# Patient Record
Sex: FEMALE | Race: WHITE | Employment: OTHER | URBAN - METROPOLITAN AREA
[De-identification: names, ages, dates, MRNs, and addresses within clinical notes are randomized per-mention and may not be internally consistent; named-entity substitution may affect disease eponyms.]

---

## 2018-09-11 PROBLEM — E11.9 DMII (DIABETES MELLITUS, TYPE 2) (HCC): Status: ACTIVE | Noted: 2018-09-11

## 2018-09-11 PROBLEM — G93.41 ACUTE METABOLIC ENCEPHALOPATHY: Status: ACTIVE | Noted: 2018-09-11

## 2018-09-11 PROBLEM — T50.901A ACCIDENTAL MEDICATION OVERDOSE, INITIAL ENCOUNTER: Status: ACTIVE | Noted: 2018-09-11

## 2018-09-11 PROBLEM — I63.9 STROKE, ACUTE, EMBOLIC (HCC): Status: ACTIVE | Noted: 2018-09-11

## 2018-09-11 PROBLEM — K92.2 GI BLEED: Status: ACTIVE | Noted: 2018-09-11

## 2020-08-02 ENCOUNTER — APPOINTMENT (OUTPATIENT)
Dept: CT IMAGING | Age: 75
DRG: 871 | End: 2020-08-02
Payer: MEDICARE

## 2020-08-02 ENCOUNTER — HOSPITAL ENCOUNTER (INPATIENT)
Age: 75
LOS: 3 days | Discharge: HOME | DRG: 871 | End: 2020-08-05
Attending: STUDENT IN AN ORGANIZED HEALTH CARE EDUCATION/TRAINING PROGRAM | Admitting: STUDENT IN AN ORGANIZED HEALTH CARE EDUCATION/TRAINING PROGRAM
Payer: MEDICARE

## 2020-08-02 ENCOUNTER — APPOINTMENT (OUTPATIENT)
Dept: GENERAL RADIOLOGY | Age: 75
DRG: 871 | End: 2020-08-02
Payer: MEDICARE

## 2020-08-02 PROBLEM — N39.0 URINARY TRACT INFECTION: Status: ACTIVE | Noted: 2020-08-02

## 2020-08-02 PROBLEM — J18.9 LEFT LOWER LOBE PNEUMONIA: Status: ACTIVE | Noted: 2020-08-02

## 2020-08-02 PROBLEM — A41.9 SEPSIS (HCC): Status: ACTIVE | Noted: 2020-08-02

## 2020-08-02 LAB
AMORPHOUS: ABNORMAL /HPF
ANION GAP SERPL CALCULATED.3IONS-SCNC: 15 MMOL/L (ref 3–16)
BASE EXCESS VENOUS: -2.1 MMOL/L
BASOPHILS ABSOLUTE: 0.1 K/UL (ref 0–0.2)
BASOPHILS RELATIVE PERCENT: 0.6 %
BILIRUBIN URINE: ABNORMAL
BLOOD, URINE: ABNORMAL
BUN BLDV-MCNC: 20 MG/DL (ref 7–20)
CALCIUM SERPL-MCNC: 9.4 MG/DL (ref 8.3–10.6)
CARBOXYHEMOGLOBIN: 1 %
CHLORIDE BLD-SCNC: 88 MMOL/L (ref 99–110)
CHLORIDE URINE RANDOM: 29 MMOL/L
CLARITY: ABNORMAL
CO2: 23 MMOL/L (ref 21–32)
COLOR: YELLOW
CREAT SERPL-MCNC: 1 MG/DL (ref 0.6–1.2)
EOSINOPHILS ABSOLUTE: 0 K/UL (ref 0–0.6)
EOSINOPHILS RELATIVE PERCENT: 0 %
EPITHELIAL CELLS, UA: 1 /HPF (ref 0–5)
GFR AFRICAN AMERICAN: >60
GFR NON-AFRICAN AMERICAN: 54
GLUCOSE BLD-MCNC: 250 MG/DL (ref 70–99)
GLUCOSE URINE: NEGATIVE MG/DL
HCO3 VENOUS: 21 MMOL/L (ref 23–29)
HCT VFR BLD CALC: 44.5 % (ref 36–48)
HEMOGLOBIN: 14.8 G/DL (ref 12–16)
HYALINE CASTS: 10 /LPF (ref 0–8)
KETONES, URINE: ABNORMAL MG/DL
LACTIC ACID, SEPSIS: 2.3 MMOL/L (ref 0.4–1.9)
LEUKOCYTE ESTERASE, URINE: ABNORMAL
LYMPHOCYTES ABSOLUTE: 0.9 K/UL (ref 1–5.1)
LYMPHOCYTES RELATIVE PERCENT: 5.9 %
MCH RBC QN AUTO: 30.6 PG (ref 26–34)
MCHC RBC AUTO-ENTMCNC: 33.3 G/DL (ref 31–36)
MCV RBC AUTO: 91.9 FL (ref 80–100)
METHEMOGLOBIN VENOUS: 0.6 %
MICROSCOPIC EXAMINATION: YES
MONOCYTES ABSOLUTE: 1 K/UL (ref 0–1.3)
MONOCYTES RELATIVE PERCENT: 6.2 %
NEUTROPHILS ABSOLUTE: 13.8 K/UL (ref 1.7–7.7)
NEUTROPHILS RELATIVE PERCENT: 87.3 %
NITRITE, URINE: NEGATIVE
O2 CONTENT, VEN: 14 ML/DL
O2 SAT, VEN: 83 %
O2 THERAPY: ABNORMAL
PCO2, VEN: 31 MMHG (ref 40–50)
PDW BLD-RTO: 14.4 % (ref 12.4–15.4)
PH UA: 6 (ref 5–8)
PH VENOUS: 7.44 (ref 7.35–7.45)
PLATELET # BLD: 225 K/UL (ref 135–450)
PMV BLD AUTO: 8.6 FL (ref 5–10.5)
PO2, VEN: 46 MMHG
POTASSIUM REFLEX MAGNESIUM: 5 MMOL/L (ref 3.5–5.1)
POTASSIUM, UR: 98.5 MMOL/L
PRO-BNP: 710 PG/ML (ref 0–124)
PROTEIN UA: 100 MG/DL
RBC # BLD: 4.84 M/UL (ref 4–5.2)
RBC UA: 3 /HPF (ref 0–4)
SARS-COV-2, NAAT: NOT DETECTED
SODIUM BLD-SCNC: 126 MMOL/L (ref 136–145)
SODIUM URINE: 35 MMOL/L
SPECIFIC GRAVITY UA: 1.02 (ref 1–1.03)
TCO2 CALC VENOUS: 22 MMOL/L
TROPONIN: <0.01 NG/ML
URINE REFLEX TO CULTURE: YES
URINE TYPE: ABNORMAL
UROBILINOGEN, URINE: 1 E.U./DL
WBC # BLD: 15.9 K/UL (ref 4–11)
WBC UA: 138 /HPF (ref 0–5)

## 2020-08-02 PROCEDURE — 71045 X-RAY EXAM CHEST 1 VIEW: CPT

## 2020-08-02 PROCEDURE — 87040 BLOOD CULTURE FOR BACTERIA: CPT

## 2020-08-02 PROCEDURE — 83605 ASSAY OF LACTIC ACID: CPT

## 2020-08-02 PROCEDURE — 2060000000 HC ICU INTERMEDIATE R&B

## 2020-08-02 PROCEDURE — 87086 URINE CULTURE/COLONY COUNT: CPT

## 2020-08-02 PROCEDURE — 84145 PROCALCITONIN (PCT): CPT

## 2020-08-02 PROCEDURE — 84484 ASSAY OF TROPONIN QUANT: CPT

## 2020-08-02 PROCEDURE — 6360000002 HC RX W HCPCS: Performed by: PHYSICIAN ASSISTANT

## 2020-08-02 PROCEDURE — 82436 ASSAY OF URINE CHLORIDE: CPT

## 2020-08-02 PROCEDURE — 99285 EMERGENCY DEPT VISIT HI MDM: CPT

## 2020-08-02 PROCEDURE — 70450 CT HEAD/BRAIN W/O DYE: CPT

## 2020-08-02 PROCEDURE — 93005 ELECTROCARDIOGRAM TRACING: CPT | Performed by: PHYSICIAN ASSISTANT

## 2020-08-02 PROCEDURE — 81001 URINALYSIS AUTO W/SCOPE: CPT

## 2020-08-02 PROCEDURE — 2580000003 HC RX 258: Performed by: PHYSICIAN ASSISTANT

## 2020-08-02 PROCEDURE — 80048 BASIC METABOLIC PNL TOTAL CA: CPT

## 2020-08-02 PROCEDURE — 96365 THER/PROPH/DIAG IV INF INIT: CPT

## 2020-08-02 PROCEDURE — 82803 BLOOD GASES ANY COMBINATION: CPT

## 2020-08-02 PROCEDURE — U0002 COVID-19 LAB TEST NON-CDC: HCPCS

## 2020-08-02 PROCEDURE — 87449 NOS EACH ORGANISM AG IA: CPT

## 2020-08-02 PROCEDURE — 84300 ASSAY OF URINE SODIUM: CPT

## 2020-08-02 PROCEDURE — 71250 CT THORAX DX C-: CPT

## 2020-08-02 PROCEDURE — 84133 ASSAY OF URINE POTASSIUM: CPT

## 2020-08-02 PROCEDURE — 6370000000 HC RX 637 (ALT 250 FOR IP): Performed by: PHYSICIAN ASSISTANT

## 2020-08-02 PROCEDURE — 85025 COMPLETE CBC W/AUTO DIFF WBC: CPT

## 2020-08-02 PROCEDURE — 83880 ASSAY OF NATRIURETIC PEPTIDE: CPT

## 2020-08-02 PROCEDURE — 36415 COLL VENOUS BLD VENIPUNCTURE: CPT

## 2020-08-02 RX ORDER — SODIUM CHLORIDE 0.9 % (FLUSH) 0.9 %
10 SYRINGE (ML) INJECTION PRN
Status: DISCONTINUED | OUTPATIENT
Start: 2020-08-02 | End: 2020-08-02 | Stop reason: SDUPTHER

## 2020-08-02 RX ORDER — 0.9 % SODIUM CHLORIDE 0.9 %
1000 INTRAVENOUS SOLUTION INTRAVENOUS ONCE
Status: COMPLETED | OUTPATIENT
Start: 2020-08-02 | End: 2020-08-02

## 2020-08-02 RX ORDER — ONDANSETRON 2 MG/ML
4 INJECTION INTRAMUSCULAR; INTRAVENOUS EVERY 6 HOURS PRN
Status: DISCONTINUED | OUTPATIENT
Start: 2020-08-02 | End: 2020-08-05 | Stop reason: HOSPADM

## 2020-08-02 RX ORDER — SODIUM CHLORIDE 0.9 % (FLUSH) 0.9 %
10 SYRINGE (ML) INJECTION EVERY 12 HOURS SCHEDULED
Status: DISCONTINUED | OUTPATIENT
Start: 2020-08-03 | End: 2020-08-02 | Stop reason: SDUPTHER

## 2020-08-02 RX ORDER — ACETAMINOPHEN 325 MG/1
650 TABLET ORAL ONCE
Status: COMPLETED | OUTPATIENT
Start: 2020-08-02 | End: 2020-08-02

## 2020-08-02 RX ORDER — SODIUM CHLORIDE 0.9 % (FLUSH) 0.9 %
10 SYRINGE (ML) INJECTION PRN
Status: DISCONTINUED | OUTPATIENT
Start: 2020-08-02 | End: 2020-08-05 | Stop reason: HOSPADM

## 2020-08-02 RX ORDER — SODIUM CHLORIDE 0.9 % (FLUSH) 0.9 %
10 SYRINGE (ML) INJECTION EVERY 12 HOURS SCHEDULED
Status: DISCONTINUED | OUTPATIENT
Start: 2020-08-03 | End: 2020-08-05 | Stop reason: HOSPADM

## 2020-08-02 RX ORDER — LOSARTAN POTASSIUM 25 MG/1
25 TABLET ORAL DAILY
Status: DISCONTINUED | OUTPATIENT
Start: 2020-08-03 | End: 2020-08-05 | Stop reason: HOSPADM

## 2020-08-02 RX ORDER — ASPIRIN 81 MG/1
81 TABLET ORAL DAILY
Status: DISCONTINUED | OUTPATIENT
Start: 2020-08-03 | End: 2020-08-05 | Stop reason: HOSPADM

## 2020-08-02 RX ORDER — 0.9 % SODIUM CHLORIDE 0.9 %
1540 INTRAVENOUS SOLUTION INTRAVENOUS ONCE
Status: COMPLETED | OUTPATIENT
Start: 2020-08-02 | End: 2020-08-02

## 2020-08-02 RX ORDER — NICOTINE POLACRILEX 4 MG
15 LOZENGE BUCCAL PRN
Status: DISCONTINUED | OUTPATIENT
Start: 2020-08-02 | End: 2020-08-05 | Stop reason: HOSPADM

## 2020-08-02 RX ORDER — DEXTROSE MONOHYDRATE 50 MG/ML
100 INJECTION, SOLUTION INTRAVENOUS PRN
Status: DISCONTINUED | OUTPATIENT
Start: 2020-08-02 | End: 2020-08-05 | Stop reason: HOSPADM

## 2020-08-02 RX ORDER — VENLAFAXINE HYDROCHLORIDE 75 MG/1
75 CAPSULE, EXTENDED RELEASE ORAL DAILY
Status: DISCONTINUED | OUTPATIENT
Start: 2020-08-03 | End: 2020-08-05 | Stop reason: HOSPADM

## 2020-08-02 RX ORDER — DEXTROSE MONOHYDRATE 25 G/50ML
12.5 INJECTION, SOLUTION INTRAVENOUS PRN
Status: DISCONTINUED | OUTPATIENT
Start: 2020-08-02 | End: 2020-08-05 | Stop reason: HOSPADM

## 2020-08-02 RX ORDER — ATORVASTATIN CALCIUM 40 MG/1
40 TABLET, FILM COATED ORAL DAILY
Status: DISCONTINUED | OUTPATIENT
Start: 2020-08-03 | End: 2020-08-05 | Stop reason: HOSPADM

## 2020-08-02 RX ORDER — SODIUM CHLORIDE 9 MG/ML
INJECTION, SOLUTION INTRAVENOUS CONTINUOUS
Status: ACTIVE | OUTPATIENT
Start: 2020-08-03 | End: 2020-08-03

## 2020-08-02 RX ADMIN — SODIUM CHLORIDE 1000 ML: 9 INJECTION, SOLUTION INTRAVENOUS at 18:45

## 2020-08-02 RX ADMIN — SODIUM CHLORIDE 1540 ML: 9 INJECTION, SOLUTION INTRAVENOUS at 20:01

## 2020-08-02 RX ADMIN — ACETAMINOPHEN 650 MG: 325 TABLET ORAL at 18:45

## 2020-08-02 RX ADMIN — CEFTRIAXONE 1 G: 1 INJECTION, POWDER, FOR SOLUTION INTRAMUSCULAR; INTRAVENOUS at 20:01

## 2020-08-02 ASSESSMENT — PAIN DESCRIPTION - PAIN TYPE: TYPE: ACUTE PAIN

## 2020-08-02 ASSESSMENT — PAIN SCALES - GENERAL
PAINLEVEL_OUTOF10: 0
PAINLEVEL_OUTOF10: 6
PAINLEVEL_OUTOF10: 0
PAINLEVEL_OUTOF10: 0
PAINLEVEL_OUTOF10: 6

## 2020-08-02 ASSESSMENT — PAIN DESCRIPTION - DESCRIPTORS: DESCRIPTORS: ACHING

## 2020-08-02 ASSESSMENT — PAIN DESCRIPTION - FREQUENCY: FREQUENCY: INTERMITTENT

## 2020-08-02 ASSESSMENT — PAIN DESCRIPTION - LOCATION: LOCATION: GENERALIZED;BACK

## 2020-08-02 NOTE — ED NOTES
Bed: B-04  Expected date: 8/2/20  Expected time: 5:26 PM  Means of arrival: [facility]  Comments:  SOB, Weakness     Osmani Ice Dax, Critical access hospital0 [facility] [place]  08/02/20 4496

## 2020-08-02 NOTE — H&P
Hospital Medicine History & Physical      PCP: Santino Orozco MD    Date of Admission: 8/2/2020    Date of Service: Pt seen/examined on 8/2/2020 and Admitted to Inpatient     chief Complaint: Cough, generalized weakness, fever      History Of Present Illness: The patient is a 70 y.o. female past medical history of anxiety, depression, headache, hyperlipidemia, hypertension who presents to [facility] with her days of worsening generalized weakness, fatigue, cough nonproductive, generalized malaise and a new onset fever the morning prior to arrival.  Patient interview when I saw her was somewhat limited as patient seemed to be somewhat more confused compared to what was read in the ED note. Per documentation, patient had the above symptoms and was more coherent and answering questions. All data presented, patient remarks to me and in documentation that she denied any chest pain, shortness of breath, abdominal pain, nausea, vomiting, diarrhea-she has been than usual but still eating. She also denies any dysuria, new or out of the ordinary pain. Past Medical History:        Diagnosis Date    Anxiety     Depression     has seen dr Linn Grimm in the past who placed her on the meds    Headache(784.0)     Hyperlipidemia     Hypertension     Insomnia        Past Surgical History:        Procedure Laterality Date    HYSTERECTOMY         Medications Prior to Admission:    Prior to Admission medications    Medication Sig Start Date End Date Taking?  Authorizing Provider   simvastatin (ZOCOR) 40 MG tablet Take 1 tablet by mouth nightly 9/14/18  Yes Roberto Lyles MD   pioglitazone (ACTOS) 30 MG tablet Take 30 mg by mouth daily   Yes Historical Provider, MD   glipiZIDE (GLUCOTROL XL) 10 MG extended release tablet Take 10 mg by mouth daily   Yes Historical Provider, MD losartan (COZAAR) 25 MG tablet Take 25 mg by mouth daily   Yes Historical Provider, MD   Multiple Vitamins-Minerals (CEROVITE ADVANCED FORMULA PO) Take  by mouth. Yes Historical Provider, MD   pantoprazole (PROTONIX) 40 MG tablet Take 1 tablet by mouth 2 times daily 9/14/18 10/14/18  Meka Lara MD   venlafaxine (EFFEXOR XR) 75 MG extended release capsule Take 75 mg by mouth daily    Historical Provider, MD   clonazepam (KLONOPIN) 1 MG tablet Take 1 tablet by mouth 2 times daily as needed for Anxiety. 4/12/12   Niall Kapoor MD   senna (SENOKOT) 8.6 MG tablet Take 2 tablets by mouth daily. 3/1/12   Niall Kapoor MD   vitamin D (ERGOCALCIFEROL) 59327 UNITS CAPS capsule Take 1 capsule by mouth every 28 days. 3/1/12   Niall Kapoor MD       Allergies:  Codeine and Sulfa antibiotics    Social History:  The patient currently lives home    TOBACCO:   reports that she has been smoking. She has never used smokeless tobacco.  ETOH:   reports no history of alcohol use. Since abuse: Negative  Family History:  Reviewed in detail and negative for DM, Early CAD, Cancer, CVA. Positive as follows: Adopted: Yes       REVIEW OF SYSTEMS: As noted in the HPI. All other systems reviewed and negative. PHYSICAL EXAM:    /66   Pulse 98   Temp 98.6 °F (37 °C) (Temporal)   Resp 16   Ht 5' 6\" (1.676 m)   Wt 181 lb 14.1 oz (82.5 kg)   SpO2 95%   BMI 29.36 kg/m²     General appearance: Alert and oriented times self and place. Chronically ill-appearing, fatigued, dry appearing, disheveled  HEENT Normal cephalic, atraumatic without obvious deformity. Pupils equal, round, and reactive to light. Extra ocular muscles intact. Conjunctivae/corneas clear. Mildly dry mucous membranes  Neck: Supple, no JVD, no cervical lymphadenopathy  Lungs: Diminished to all lung fields, crackles noted mainly to the bases, no wheezes.   Heart: Regular rate and rhythm, S1/S2 noted, no murmurs,  Abdomen: Soft, nontender, nondistended, no guarding, active bowel sounds  Extremities: No edema bilaterally  Skin: Dry, intact, no rashes  Neurologic: Grossly intact neurologically, however oriented as above, sensation and motor seem to be intact, strength 5 out of 5 to all extremities  Mental status: Orientation mentioned above  Capillary Refill: Acceptable  < 3 seconds  Peripheral Pulses: +3 Easily felt, not easily obliterated with pressure      CXR: Mild dependent left basilar atelectasis  CT chest noncontrast:   Left lower lobe pneumonia.  Recommend follow-up PA and lateral chest    radiographs in 8-12 weeks.         10 mm average diameter solid nodule in the subpleural right lower lobe is not    substantially changed since 09/11/2018, most likely a prominent benign    parenchymal lymph node.  Recommend follow-up CT chest in 6-12 months.         Moderate centrilobular and paraseptal emphysema.  Diffuse airway inflammation    may be related to smoking or reflect bronchitis.         Stable benign left adrenal adenomas.         Partially imaged right kidney demonstrates outwardly convex margins. Recommend nonemergent follow-up CT abdomen and pelvis with contrast to more    completely assess the right kidney.               CBC   Recent Labs     08/02/20  1818 08/03/20  0354   WBC 15.9* 13.7*   HGB 14.8 13.8   HCT 44.5 41.5    205      RENAL  Recent Labs     08/02/20  1818 08/02/20  2358 08/03/20  0354   * 131* 131*   K 5.0 4.3 4.1   CL 88* 92* 95*   CO2 23 21 22   BUN 20 17 14   CREATININE 1.0 1.0 0.8     LFT'S  Recent Labs     08/03/20  0354   AST 80*   ALT 29   BILITOT 0.9   ALKPHOS 43     COAG  No results for input(s): INR in the last 72 hours.   CARDIAC ENZYMES  Recent Labs     08/02/20  1818   TROPONINI <0.01       U/A:    Lab Results   Component Value Date    COLORU YELLOW 08/02/2020    WBCUA 138 08/02/2020    RBCUA 3 08/02/2020    CLARITYU CLOUDY 08/02/2020    SPECGRAV 1.023 08/02/2020    LEUKOCYTESUR MODERATE 08/02/2020    BLOODU MODERATE 08/02/2020    GLUCOSEU Negative 08/02/2020    AMORPHOUS 2+ 08/02/2020       ABG  No results found for: VJW2IRO, BEART, Y2BTETMU, PHART, THGBART, VTS9EET, PO2ART, XAS1BMX        Active Hospital Problems    Diagnosis Date Noted    Sepsis ([facility] [A41.9] 08/02/2020    Urinary tract infection [N39.0] 08/02/2020    Left lower lobe pneumonia ([facility] [J18.1] 08/02/2020    DMII (diabetes mellitus, type 2) ([facility] [E11.9] 09/11/2018    Acute metabolic encephalopathy [L07.60] 09/11/2018    Essential hypertension, benign [I10] 03/01/2012         PHYSICIANS CERTIFICATION:    I certify that Dora Farfan is expected to be hospitalized for greater than 2 midnights based on the following assessment and plan:      ASSESSMENT/PLAN:  · Rocephin and Zithromax started for antibiotics, sputum culture and blood cultures pending,  · Gentle hydration with normal saline at 75/h for hyponatremia, ordered urine electrolytes in ED, nephrology consult for further work-up especially if not improving  · Repeat BMPs, and morning labs with procalcitonin in the morning  · Restart home medications, insulin sliding scale and Accu-Cheks,    DVT Prophylaxis: Lovenox  Diet: DIET CARB CONTROL; Carb Control: 4 carb choices (60 gms)/meal  Code Status: Full Code  PT/OT Eval Status: Supposedly ambulatory    Dispo -pending clinical course, antibiotics, gentle hydration, nephrology consult       Abiola Olivo DO    Thank you Matthew Plunkett MD for the opportunity to be involved in this patient's care. If you have any questions or concerns please feel free to contact me at [phone].

## 2020-08-03 PROBLEM — E87.1 HYPONATREMIA: Status: ACTIVE | Noted: 2020-08-03

## 2020-08-03 LAB
A/G RATIO: 1 (ref 1.1–2.2)
ALBUMIN SERPL-MCNC: 3.3 G/DL (ref 3.4–5)
ALP BLD-CCNC: 43 U/L (ref 40–129)
ALT SERPL-CCNC: 29 U/L (ref 10–40)
ANION GAP SERPL CALCULATED.3IONS-SCNC: 13 MMOL/L (ref 3–16)
ANION GAP SERPL CALCULATED.3IONS-SCNC: 14 MMOL/L (ref 3–16)
ANION GAP SERPL CALCULATED.3IONS-SCNC: 16 MMOL/L (ref 3–16)
ANION GAP SERPL CALCULATED.3IONS-SCNC: 18 MMOL/L (ref 3–16)
AST SERPL-CCNC: 80 U/L (ref 15–37)
BILIRUB SERPL-MCNC: 0.9 MG/DL (ref 0–1)
BUN BLDV-MCNC: 14 MG/DL (ref 7–20)
BUN BLDV-MCNC: 17 MG/DL (ref 7–20)
CALCIUM SERPL-MCNC: 8 MG/DL (ref 8.3–10.6)
CALCIUM SERPL-MCNC: 8.3 MG/DL (ref 8.3–10.6)
CALCIUM SERPL-MCNC: 8.6 MG/DL (ref 8.3–10.6)
CALCIUM SERPL-MCNC: 9.2 MG/DL (ref 8.3–10.6)
CHLORIDE BLD-SCNC: 92 MMOL/L (ref 99–110)
CHLORIDE BLD-SCNC: 95 MMOL/L (ref 99–110)
CHLORIDE BLD-SCNC: 97 MMOL/L (ref 99–110)
CHLORIDE BLD-SCNC: 98 MMOL/L (ref 99–110)
CHOLESTEROL, TOTAL: 143 MG/DL (ref 0–199)
CO2: 20 MMOL/L (ref 21–32)
CO2: 21 MMOL/L (ref 21–32)
CO2: 22 MMOL/L (ref 21–32)
CO2: 24 MMOL/L (ref 21–32)
CREAT SERPL-MCNC: 0.8 MG/DL (ref 0.6–1.2)
CREAT SERPL-MCNC: 0.8 MG/DL (ref 0.6–1.2)
CREAT SERPL-MCNC: 0.9 MG/DL (ref 0.6–1.2)
CREAT SERPL-MCNC: 1 MG/DL (ref 0.6–1.2)
EKG ATRIAL RATE: 105 BPM
EKG DIAGNOSIS: NORMAL
EKG P AXIS: 74 DEGREES
EKG P-R INTERVAL: 112 MS
EKG Q-T INTERVAL: 336 MS
EKG QRS DURATION: 100 MS
EKG QTC CALCULATION (BAZETT): 444 MS
EKG R AXIS: -28 DEGREES
EKG T AXIS: 68 DEGREES
EKG VENTRICULAR RATE: 105 BPM
ESTIMATED AVERAGE GLUCOSE: 142.7 MG/DL
GFR AFRICAN AMERICAN: >60
GFR NON-AFRICAN AMERICAN: 54
GFR NON-AFRICAN AMERICAN: >60
GLOBULIN: 3.3 G/DL
GLUCOSE BLD-MCNC: 105 MG/DL (ref 70–99)
GLUCOSE BLD-MCNC: 111 MG/DL (ref 70–99)
GLUCOSE BLD-MCNC: 118 MG/DL (ref 70–99)
GLUCOSE BLD-MCNC: 126 MG/DL (ref 70–99)
GLUCOSE BLD-MCNC: 139 MG/DL (ref 70–99)
GLUCOSE BLD-MCNC: 145 MG/DL (ref 70–99)
GLUCOSE BLD-MCNC: 148 MG/DL (ref 70–99)
GLUCOSE BLD-MCNC: 168 MG/DL (ref 70–99)
GLUCOSE BLD-MCNC: 99 MG/DL (ref 70–99)
HBA1C MFR BLD: 6.6 %
HCT VFR BLD CALC: 41.5 % (ref 36–48)
HDLC SERPL-MCNC: 55 MG/DL (ref 40–60)
HEMOGLOBIN: 13.8 G/DL (ref 12–16)
LDL CHOLESTEROL CALCULATED: 71 MG/DL
MCH RBC QN AUTO: 30.2 PG (ref 26–34)
MCHC RBC AUTO-ENTMCNC: 33.3 G/DL (ref 31–36)
MCV RBC AUTO: 90.6 FL (ref 80–100)
PDW BLD-RTO: 14.2 % (ref 12.4–15.4)
PERFORMED ON: ABNORMAL
PERFORMED ON: NORMAL
PLATELET # BLD: 205 K/UL (ref 135–450)
PMV BLD AUTO: 9 FL (ref 5–10.5)
POTASSIUM SERPL-SCNC: 3.9 MMOL/L (ref 3.5–5.1)
POTASSIUM SERPL-SCNC: 4.1 MMOL/L (ref 3.5–5.1)
POTASSIUM SERPL-SCNC: 4.1 MMOL/L (ref 3.5–5.1)
POTASSIUM SERPL-SCNC: 4.3 MMOL/L (ref 3.5–5.1)
PROCALCITONIN: 0.3 NG/ML (ref 0–0.15)
RBC # BLD: 4.58 M/UL (ref 4–5.2)
SARS-COV-2, PCR: NOT DETECTED
SODIUM BLD-SCNC: 131 MMOL/L (ref 136–145)
SODIUM BLD-SCNC: 131 MMOL/L (ref 136–145)
SODIUM BLD-SCNC: 134 MMOL/L (ref 136–145)
SODIUM BLD-SCNC: 134 MMOL/L (ref 136–145)
TOTAL PROTEIN: 6.6 G/DL (ref 6.4–8.2)
TRIGL SERPL-MCNC: 83 MG/DL (ref 0–150)
URINE CULTURE, ROUTINE: NORMAL
VLDLC SERPL CALC-MCNC: 17 MG/DL
WBC # BLD: 13.7 K/UL (ref 4–11)

## 2020-08-03 PROCEDURE — 2580000003 HC RX 258: Performed by: STUDENT IN AN ORGANIZED HEALTH CARE EDUCATION/TRAINING PROGRAM

## 2020-08-03 PROCEDURE — 80053 COMPREHEN METABOLIC PANEL: CPT

## 2020-08-03 PROCEDURE — 85027 COMPLETE CBC AUTOMATED: CPT

## 2020-08-03 PROCEDURE — 2700000000 HC OXYGEN THERAPY PER DAY

## 2020-08-03 PROCEDURE — 80061 LIPID PANEL: CPT

## 2020-08-03 PROCEDURE — 83036 HEMOGLOBIN GLYCOSYLATED A1C: CPT

## 2020-08-03 PROCEDURE — 6360000002 HC RX W HCPCS: Performed by: STUDENT IN AN ORGANIZED HEALTH CARE EDUCATION/TRAINING PROGRAM

## 2020-08-03 PROCEDURE — 93010 ELECTROCARDIOGRAM REPORT: CPT | Performed by: INTERNAL MEDICINE

## 2020-08-03 PROCEDURE — 2060000000 HC ICU INTERMEDIATE R&B

## 2020-08-03 PROCEDURE — 94761 N-INVAS EAR/PLS OXIMETRY MLT: CPT

## 2020-08-03 PROCEDURE — 6370000000 HC RX 637 (ALT 250 FOR IP): Performed by: STUDENT IN AN ORGANIZED HEALTH CARE EDUCATION/TRAINING PROGRAM

## 2020-08-03 PROCEDURE — U0003 INFECTIOUS AGENT DETECTION BY NUCLEIC ACID (DNA OR RNA); SEVERE ACUTE RESPIRATORY SYNDROME CORONAVIRUS 2 (SARS-COV-2) (CORONAVIRUS DISEASE [COVID-19]), AMPLIFIED PROBE TECHNIQUE, MAKING USE OF HIGH THROUGHPUT TECHNOLOGIES AS DESCRIBED BY CMS-2020-01-R: HCPCS

## 2020-08-03 PROCEDURE — 6370000000 HC RX 637 (ALT 250 FOR IP): Performed by: INTERNAL MEDICINE

## 2020-08-03 PROCEDURE — 36415 COLL VENOUS BLD VENIPUNCTURE: CPT

## 2020-08-03 RX ORDER — IRON POLYSACCHARIDE COMPLEX 180 MG
1 CAPSULE ORAL 2 TIMES DAILY
COMMUNITY

## 2020-08-03 RX ORDER — CLONIDINE HYDROCHLORIDE 0.1 MG/1
0.1 TABLET ORAL NIGHTLY PRN
Status: ON HOLD | COMMUNITY
End: 2020-08-05 | Stop reason: HOSPADM

## 2020-08-03 RX ORDER — ALBUTEROL SULFATE 90 UG/1
2 AEROSOL, METERED RESPIRATORY (INHALATION) 4 TIMES DAILY
COMMUNITY

## 2020-08-03 RX ORDER — ATORVASTATIN CALCIUM 20 MG/1
20 TABLET, FILM COATED ORAL NIGHTLY
Status: ON HOLD | COMMUNITY
End: 2021-03-02

## 2020-08-03 RX ORDER — ACETAMINOPHEN 325 MG/1
650 TABLET ORAL EVERY 4 HOURS PRN
Status: DISCONTINUED | OUTPATIENT
Start: 2020-08-03 | End: 2020-08-05 | Stop reason: HOSPADM

## 2020-08-03 RX ADMIN — CEFTRIAXONE 1 G: 1 INJECTION, POWDER, FOR SOLUTION INTRAMUSCULAR; INTRAVENOUS at 19:53

## 2020-08-03 RX ADMIN — ATORVASTATIN CALCIUM 40 MG: 40 TABLET, FILM COATED ORAL at 08:20

## 2020-08-03 RX ADMIN — ASPIRIN 81 MG: 81 TABLET, COATED ORAL at 08:20

## 2020-08-03 RX ADMIN — ACETAMINOPHEN 650 MG: 325 TABLET ORAL at 16:56

## 2020-08-03 RX ADMIN — SODIUM CHLORIDE: 9 INJECTION, SOLUTION INTRAVENOUS at 00:22

## 2020-08-03 RX ADMIN — VENLAFAXINE HYDROCHLORIDE 75 MG: 75 CAPSULE, EXTENDED RELEASE ORAL at 08:19

## 2020-08-03 RX ADMIN — AZITHROMYCIN MONOHYDRATE 500 MG: 500 INJECTION, POWDER, LYOPHILIZED, FOR SOLUTION INTRAVENOUS at 00:23

## 2020-08-03 RX ADMIN — INSULIN LISPRO 1 UNITS: 100 INJECTION, SOLUTION INTRAVENOUS; SUBCUTANEOUS at 08:21

## 2020-08-03 RX ADMIN — Medication 10 ML: at 08:21

## 2020-08-03 RX ADMIN — ENOXAPARIN SODIUM 40 MG: 40 INJECTION SUBCUTANEOUS at 08:20

## 2020-08-03 RX ADMIN — LOSARTAN POTASSIUM 25 MG: 25 TABLET, FILM COATED ORAL at 08:20

## 2020-08-03 RX ADMIN — ACETAMINOPHEN 650 MG: 325 TABLET ORAL at 08:19

## 2020-08-03 ASSESSMENT — PAIN SCALES - GENERAL
PAINLEVEL_OUTOF10: 0

## 2020-08-03 NOTE — ED PROVIDER NOTES
[address]      Pt Name: Cat Mcghee  MRN: [id number]  [place] 1945  Date of evaluation: 8/2/2020  Provider: JOSE ANTONIO Velazquez    Shared Visit or Autonomous Visit: Evaluation by PHILOMENA. My supervising physician was available for consultation. CHIEF COMPLAINT       Chief Complaint   Patient presents with    Cough     starting today    Extremity Weakness    Fever       HISTORY OF PRESENT ILLNESS  (Location/Symptom, Timing/Onset, Context/Setting, Quality, Duration, Modifying Factors, Severity.)   Cat Mcghee is a 70 y.o. female who presents to the emergency department with complaints of generalized weakness, cough, fever. Patient comes from home by EMS, and family members report that the patient has been feeling poorly for days, but developed a fever this morning, was so was sent to the hospital.  Patient appears slightly disoriented upon presentation, cannot say exactly how long she is been feeling bad, but says it is been 4 days. Admits to cough. Denies chest pain, shortness of breath, abdominal pain or nausea. Says she has been eating, no vomiting or diarrhea. Denies any chronic lung disease. Denies any recent hospitalizations or other recent illness. No other complaints. Nursing Notes were reviewed and I agree. REVIEW OF SYSTEMS    (2-9 systems for level 4, 10 or more for level 5)     Constitutional: Positive for fever, fatigue, generalized body aches and weakness. HENT:  Negative for congestion, ear pain, facial swelling, rhinorrhea, sneezing, sore throat and trouble swallowing. Eyes:  Negative for photophobia, pain and visual disturbance. Respiratory: Positive for cough. Negative for shortness of breath, wheezing and stridor. Cardiovascular:  Negative for chest pain, palpitations and leg swelling. Gastrointestinal:  Negative for nausea, vomiting, abdominal pain, diarrhea, constipation and blood in stool. Genitourinary:  Negative for dysuria, urgency, hematuria, flank pain, vaginal bleeding, vaginal discharge and pelvic pain. Musculoskeletal:  Negative for myalgias, arthralgias, neck pain and neck stiffness. Neurological:  Negative for dizziness, seizures, syncope, speech difficulty, focal weakness, numbness and headache. Psychiatric/Behavioral:  Negative for suicidal ideas, hallucinations, confusion. Except as noted above the remainder of the review of systems was reviewed and negative. PAST MEDICAL HISTORY         Diagnosis Date    Anxiety     Depression     has seen dr Delmis Richard in the past who placed her on the meds    Headache(784.0)     Hyperlipidemia     Hypertension     Insomnia        SURGICAL HISTORY           Procedure Laterality Date    HYSTERECTOMY         CURRENT MEDICATIONS       Current Discharge Medication List      CONTINUE these medications which have NOT CHANGED    Details   simvastatin (ZOCOR) 40 MG tablet Take 1 tablet by mouth nightly  Qty: 30 tablet, Refills: 2      pioglitazone (ACTOS) 30 MG tablet Take 30 mg by mouth daily      glipiZIDE (GLUCOTROL XL) 10 MG extended release tablet Take 10 mg by mouth daily      losartan (COZAAR) 25 MG tablet Take 25 mg by mouth daily      Multiple Vitamins-Minerals (CEROVITE ADVANCED FORMULA PO) Take  by mouth.        pantoprazole (PROTONIX) 40 MG tablet Take 1 tablet by mouth 2 times daily  Qty: 60 tablet, Refills: 1      venlafaxine (EFFEXOR XR) 75 MG extended release capsule Take 75 mg by mouth daily      clonazepam (KLONOPIN) 1 MG tablet Take 1 tablet by mouth 2 times daily as needed for Anxiety. Qty: 60 tablet, Refills: 0      senna (SENOKOT) 8.6 MG tablet Take 2 tablets by mouth daily. Qty: 60 tablet, Refills: 4      vitamin D (ERGOCALCIFEROL) 32741 UNITS CAPS capsule Take 1 capsule by mouth every 28 days. Qty: 1 capsule, Refills: 4             ALLERGIES     Codeine and Sulfa antibiotics    FAMILY HISTORY           Adopted:  Yes No family status information on file. SOCIAL HISTORY      reports that she has been smoking. She has never used smokeless tobacco. She reports that she does not drink alcohol or use drugs. PHYSICAL EXAM    (up to 7 for level 4, 8 or more for level 5)     ED Triage Vitals [08/02/20 1742]   BP Temp Temp Source Pulse Resp SpO2 Height Weight   (!) 137/52 101.5 °F (38.6 °C) Oral 105 28 97 % 5' (1.524 m) 184 lb 11.9 oz (83.8 kg)       Constitutional:  Appearing well-developed and well-nourished. No distress. HENT:  Normocephalic and atraumatic. Conjunctivae and EOM are normal. Pupils are equal, round, and reactive to light. Neck:  Normal range of motion. Neck supple. No tracheal deviation present. No thyromegaly present. No cervical adenopathy. Cardiovascular:  Normal rate, regular rhythm, normal heart sounds and intact distal pulses. Pulmonary/Chest:  Effort normal and breath sounds normal. No respiratory distress. No wheezes or rales. Abdominal:  Soft. Bowel sounds are normal. No distension, mass, tenderness, rebound or guarding. Musculoskeletal:  Normal range of motion. No edema exhibited. Neurological:  Alert and oriented to person, place, and time. No cranial nerve deficit. Skin:  Skin is warm and dry. Not diaphoretic. DIAGNOSTIC RESULTS     RADIOLOGY:     Interpretation per the Radiologist below, if available at the time of this note:    CT CHEST WO CONTRAST   Preliminary Result   Left lower lobe pneumonia. Recommend follow-up PA and lateral chest   radiographs in 8-12 weeks. 10 mm average diameter solid nodule in the subpleural right lower lobe is not   substantially changed since 09/11/2018, most likely a prominent benign   parenchymal lymph node. Recommend follow-up CT chest in 6-12 months. Moderate centrilobular and paraseptal emphysema. Diffuse airway inflammation   may be related to smoking or reflect bronchitis. Stable benign left adrenal adenomas. Partially imaged right kidney demonstrates outwardly convex margins. Recommend nonemergent follow-up CT abdomen and pelvis with contrast to more   completely assess the right kidney. CT Head WO Contrast   Final Result   No acute intracranial abnormality. XR CHEST PORTABLE   Final Result   Mild dependent left basilar atelectasis. Otherwise no acute cardiopulmonary   disease.              LABS:  Labs Reviewed   CBC WITH AUTO DIFFERENTIAL - Abnormal; Notable for the following components:       Result Value    WBC 15.9 (*)     Neutrophils Absolute 13.8 (*)     Lymphocytes Absolute 0.9 (*)     All other components within normal limits    Narrative:     Performed at:  [address] [place] [address]   Phone [phone]   BASIC METABOLIC PANEL W/ REFLEX TO MG FOR LOW K - Abnormal; Notable for the following components:    Sodium 126 (*)     Chloride 88 (*)     Glucose 250 (*)     GFR Non- 54 (*)     All other components within normal limits    Narrative:     Performed at:  [address] [place] [address]   Phone [phone]   LACTATE, SEPSIS - Abnormal; Notable for the following components:    Lactic Acid, Sepsis 2.3 (*)     All other components within normal limits    Narrative:     Performed at:  [address] [place] [address]   Phone [phone]   BRAIN NATRIURETIC PEPTIDE - Abnormal; Notable for the following components:    Pro- (*)     All other components within normal limits    Narrative:     Performed at:  [address] [place] [address]   Phone [phone]   URINE RT REFLEX TO CULTURE - Abnormal; Notable for the following components:    Clarity, UA CLOUDY (*)     Bilirubin Urine SMALL (*)     Ketones, Urine TRACE (*)     Blood, Urine MODERATE (*)     Protein,  (*)     Leukocyte Esterase, Urine MODERATE (*)     All other components within normal limits    Narrative:     Performed at:  [address] [place]   Phone [phone]   BLOOD GAS, VENOUS - Abnormal; Notable for the following components:    pCO2, [place] 31.0 (*)     HCO3, Venous 21 (*)     All other components within normal limits    Narrative:     Performed at:  [address] [place]   Phone [phone]   MICROSCOPIC URINALYSIS - Abnormal; Notable for the following components:    Hyaline Casts, UA 10 (*)     WBC,  (*)     All other components within normal limits    Narrative:     Performed at:  [address] [facility] [address]   Phone [phone]   BASIC METABOLIC PANEL - Abnormal; Notable for the following components:    Sodium 131 (*)     Chloride 92 (*)     Anion Gap 18 (*)     Glucose 105 (*)     GFR Non- 54 (*)     All other components within normal limits    Narrative:     Performed at:  [place] [address]   Phone [phone]   CULTURE, BLOOD 1   CULTURE, BLOOD 2   CULTURE, URINE   CULTURE, BLOOD 1   TROPONIN    Narrative:     Performed at:  [address] [place] [facility]   Phone [phone]    Narrative:     ORDER WAS CANCELLED 08/02/2020 19:20, Cancelled: In-house testing.   Performed at:  [address] [place]   Phone [phone]   ELECTROLYTES URINE RANDOM    Narrative:     Performed at:  [address] [place]   Phone [phone]   CBC   COMPREHENSIVE METABOLIC PANEL W/ REFLEX TO MG FOR LOW K   HEMOGLOBIN A1C   LIPID PANEL   PROCALCITONIN   BASIC METABOLIC PANEL   BASIC METABOLIC PANEL   BASIC METABOLIC PANEL   POCT GLUCOSE    Narrative:     Performed at:  [facility]  [address] [place], [place] [address]   Phone [phone]   POCT GLUCOSE   POCT GLUCOSE   POCT GLUCOSE   POCT GLUCOSE   POCT GLUCOSE       All other labs were within normal range or not returned as of this dictation. EMERGENCY DEPARTMENT COURSE and DIFFERENTIAL DIAGNOSIS/MDM:   Vitals:    Vitals:    08/02/20 2230 08/02/20 2245 08/02/20 2254 08/02/20 2330   BP: (!) 151/69 (!) 140/56 (!) 140/56 116/72   Pulse: 99 94 94 100   Resp: (!) 31 (!) 33 (!) 33 24   Temp:   100.5 °F (38.1 °C) 100.8 °F (38.2 °C)   TempSrc:    Oral   SpO2: 100% 100% 100% 96%   Weight:    181 lb 14.1 oz (82.5 kg)   Height:    5' 6\" (1.676 m)       The patient's condition in the ED was stable. She presented with a temperature 101.5 °F, heart rate of 105, respiratory rate of 28. Normal blood pressure and SPO2 on room air. No chronic lung disease. Sepsis work-up was initiated. Rapid COVID-19 test was negative, normal serum pH. Urinalysis revealed a UTI. Initial lactate was 2.3, WBC was 15.9, serum sodium was 126. Serum glucose was 250 but the patient does not have diabetes diagnosis. Chest x-ray showed no acute findings. Patient was given IV Rocephin in the ED. she is septic and in need of hospitalization for further care. I consulted the hospitalist, who agreed to accept and admit the patient. Subsequent CT of the chest revealed a left lower lobe pneumonia. PROCEDURES:  None    FINAL IMPRESSION      1. Urinary tract infection with hematuria, site unspecified    2. Sepsis, due to unspecified organism, unspecified whether acute organ dysfunction present ([facility]    3. Hyponatremia    4.  Hyperglycemia          DISPOSITION/PLAN   DISPOSITION Admitted 08/02/2020 09:40:25 PM      PATIENT REFERRED TO:  Sima Medeiros MD  [address] [facility] [address]  [phone]            DISCHARGE MEDICATIONS:  Current Discharge Medication List          (Please note that portions of this note were completed with a voice recognition program.  Efforts were made to edit the dictations but occasionally words are mis-transcribed.)    Carl Rubalcava, [address] [place] [address]  08/03/20 0127

## 2020-08-03 NOTE — PROGRESS NOTES
4 Eyes Skin Assessment     The patient is being assess for  Admission    I agree that 2 RN's have performed a thorough Head to Toe Skin Assessment on the patient. ALL assessment sites listed below have been assessed. Abrasion to R knee, redness to Wil. Under the breasts and abdominal folds, red spots to Wil inner thighs and Wil buttock cheek     Areas assessed by both nurses:  Kiana Door  [x]   Head, Face, and Ears   [x]   Shoulders, Back, and Chest  [x]   Arms, Elbows, and Hands   [x]   Coccyx, Sacrum, and IschIum  [x]   Legs, Feet, and Heels        Does the Patient have Skin Breakdown?   No         Deion Prevention initiated:  Yes   Wound Care Orders initiated:  Yes      [address] nurse consulted for Pressure Injury (Stage 3,4, Unstageable, DTI, NWPT, and Complex wounds), New and Established Ostomies:  No      Nurse 1 eSignature: Electronically signed by Abhijit Catherine RN on 8/2/20 at 11:50 PM EDT    **SHARE this note so that the co-signing nurse is able to place an eSignature**    Nurse 2 eSignature: Electronically signed by Eric Montana RN on 8/2/20 at 11:51 PM EDT

## 2020-08-03 NOTE — ACP (ADVANCE CARE PLANNING)
Advance Care Planning     Advance Care Planning Activator (Inpatient)  Conversation Note      Date of ACP Conversation: 8/2/2020    Conversation Conducted with: Patient with Decision Making Capacity    ACP Activator: [address] makes decisions on behalf of the incapacitated patient: Decision Maker is asked to consider and make decisions based on patient values, known preferences, or best interests. Health Care Decision Maker:     Current Designated Health Care Decision Maker:   (If there is a valid [address] named in the [facility] Makers\" box in the ACP activity, but it is not visible above, be sure to open that field and then select the health care decision maker relationship (ie \"primary\") in the blank space to the right of the name.) Validate  this information as still accurate & up-to-date; edit [address] field as needed.)    Note: Assess and validate information in current ACP documents, as indicated. If no Decision Maker listed above or available through scanned documents, then:    If no Authorized Decision Maker has previously been identified, then patient chooses [address]:  \"Who would you like to name as your primary health care decision-maker? \"               Name: Melina Dickey        Relationship: son            Phone number: [phone]  Luz Maria Rios this person be reached easily? \" Yes  \"  PATIENT WOULD NOT LIKE TO ADD A SECONDARY AT THIS TIME    Note: If the relationship of these Decision-Makers to the patient does NOT follow your state's Next of Kin hierarchy, recommend that patient complete ACP document that meets state-specific requirements to allow them to act on the patient's behalf when appropriate. Care Preferences    Ventilation:   \"If you were in your present state of health and suddenly became very ill and were unable to breathe on your own, what would your preference be about the use of a ventilator (breathing machine) if it were available to you? \"      Would the patient desire the use of ventilator (breathing machine)?: Yes. \"If your health worsens and it becomes clear that your chance of recovery is unlikely, what would your preference be about the use of a ventilator (breathing machine) if it were available to you? \" Yes. Would the patient desire the use of ventilator (breathing machine)?:     Resuscitation  \"CPR works best to restart the heart when there is a sudden event, like a heart attack, in someone who is otherwise healthy. Unfortunately, CPR does not typically restart the heart for people who have serious health conditions or who are very sick. \"    \"In the event your heart stopped as a result of an underlying serious health condition, would you want attempts to be made to restart your heart (answer \"yes\" for attempt to resuscitate) or would you prefer a natural death (answer \"no\" for do not attempt to resuscitate)? \"  No.     NOTE: If the patient has a valid advance directive AND now provides care preference(s) that are inconsistent with that prior directive, advise the patient to consider either: creating a new advance directive that complies with state-specific requirements; or, if that is not possible, orally revoking that prior directive in accordance with state-specific requirements, which must be documented in the EHR. [x] Yes   [] No   Educated Patient / Balwinder Zambrano regarding differences between Advance Directives and portable DNR orders.     Length of ACP Conversation in minutes:  5    Conversation Outcomes:  [x] ACP discussion completed  [] Existing advance directive reviewed with patient; no changes to patient's previously recorded wishes  [] New Advance Directive completed  [] Portable Do Not Rescitate prepared for Provider review and signature  [] POLST/POST/MOLST/MOST prepared for Provider review and signature      Follow-up plan:    [] Schedule follow-up conversation to continue planning  [] Referred individual to Provider for additional questions/concerns   [] Advised patient/agent/surrogate to review completed ACP document and update if needed with changes in condition, patient preferences or care setting    [x] This note routed to one or more involved healthcare providers Dr. Sylvia Santiago MD.     Respectfully submitted,    Kyra FAULKNER, ISABELLA-S  [facility]   [phone]    Electronically signed by VICKIE Villa on 8/3/2020 at 5:01 PM

## 2020-08-03 NOTE — ED NOTES
Pt states wants to go home. Writer tried to explain that pt needs to be admitted. Writer called son. Son is attempting to talk to son on the phone.        Jyotsna Allred RN  08/02/20 2025

## 2020-08-03 NOTE — PROGRESS NOTES
Pt arrived to room 5106 from ER at about 2320. Alert with some confusion and forgetfulness, denies pain, placed on heart monitor, CMU notified, safety precautions discussed with pt, reinforcement required, will continue to monitor.

## 2020-08-03 NOTE — ED PROVIDER NOTES
ED Attending EKG Interpretation Note     Date of evaluation: 8/2/2020    This patient was seen by the advance practice provider. I have not seen or examined the patient. EKG Indication:  sob  EKG Interpretation: Rate tachycardic 105, rhythm sinus with incomplete right bundle branch block noted, axis normal.  HI in normal limits, QRS borderline 100, QTc within normal limits 444. Nonspecific T wave inversion noted V2 with no other T/ST changes consistent with acute ischemia. Comparison to prior EKG from 11 September 2018 shows isolated T wave inversions are new.       Joao Mendoza MD  08/03/20 0588 98.1

## 2020-08-03 NOTE — CARE COORDINATION
SW sent message to MD as patient indicates that she may want to be considered to be a DNR vs. Full code. Patient advised that no changes will be made until she is able to speak with MD and be counseled. Respectfully submitted,    VICKIE Johansen  [facility]   [phone]    Electronically signed by VICKIE Malik on 8/3/2020 at 5:11 PM

## 2020-08-03 NOTE — PLAN OF CARE
Problem: Falls - Risk of:  Goal: Will remain free from falls  Description: Will remain free from falls  Outcome: Ongoing  Goal: Absence of physical injury  Description: Absence of physical injury  Outcome: Ongoing     Problem: Skin Integrity:  Goal: Will show no infection signs and symptoms  Description: Will show no infection signs and symptoms  Outcome: Ongoing  Goal: Absence of new skin breakdown  Description: Absence of new skin breakdown  Outcome: Ongoing     Problem: Pain:  Goal: Pain level will decrease  Description: Pain level will decrease  Outcome: Ongoing  Goal: Control of acute pain  Description: Control of acute pain  Outcome: Ongoing  Goal: Control of chronic pain  Description: Control of chronic pain  Outcome: Ongoing     Problem: Coping:  Goal: Family's ability to cope with current situation will improve  Description: Family's ability to cope with current situation will improve  Outcome: Ongoing     Problem: Health Behavior:  Goal: Ability to identify and utilize available support systems will improve  Description: Ability to identify and utilize available support systems will improve  Outcome: Ongoing

## 2020-08-03 NOTE — PROGRESS NOTES
Progress Note  Admit Date: 8/2/2020      PCP: Santino Orozco MD     CC: F/U for fever    Days in hospital:  1    SUBJECTIVE / Interval History:  Patient feels okay. Still having low-grade fever  Oriented at present      Allergies  Codeine and Sulfa antibiotics    Medications    Scheduled Meds:   sodium chloride flush  10 mL Intravenous 2 times per day    enoxaparin  40 mg Subcutaneous Daily    aspirin  81 mg Oral Daily    cefTRIAXone (ROCEPHIN) IV  1 g Intravenous Q24H    insulin lispro  0-6 Units Subcutaneous TID WC    insulin lispro  0-3 Units Subcutaneous Nightly    losartan  25 mg Oral Daily    atorvastatin  40 mg Oral Daily    venlafaxine  75 mg Oral Daily    azithromycin  500 mg Intravenous Q24H     Continuous Infusions:   sodium chloride 75 mL/hr at 08/03/20 0022    dextrose         PRN Meds:  acetaminophen, sodium chloride flush, ondansetron, glucose, dextrose, glucagon (rDNA), dextrose    Vitals    /64   Pulse 92   Temp 100.9 °F (38.3 °C) (Oral)   Resp 20   Ht 5' 6\" (1.676 m)   Wt 181 lb 14.1 oz (82.5 kg)   SpO2 98%   BMI 29.36 kg/m²     Exam:    Gen: No distress. Eyes: PERRL. No sclera icterus. No conjunctival injection. ENT: No discharge. Pharynx clear. External appearance of ears and nose normal.  Neck: Trachea midline. No obvious mass. Resp: No accessory muscle use. No crackles. No wheezes. No rhonchi. No dullness on percussion. CV: Regular rate. Regular rhythm. No murmur or rub. No edema. GI: Non-tender. Non-distended. No hernia. Skin: Warm, dry, normal texture and turgor. No nodule on exposed extremities. Lymph: No cervical LAD. No supraclavicular LAD. M/S: No cyanosis. No clubbing. No joint deformity. Neuro: Moves all four extremities. CN 2-12 tested, no defect noted. Psych: Oriented x 3. No anxiety. Awake. Alert. Intact judgement and insight.     Data    LABS  CBC:   Recent Labs     08/02/20  1818 08/03/20  0354   WBC 15.9* 13.7*   HGB 14.8 13.8   HCT parenchymal lymph node. Recommend follow-up CT chest in 6-12 months. Moderate centrilobular and paraseptal emphysema. Diffuse airway inflammation   may be related to smoking or reflect bronchitis. Stable benign left adrenal adenomas. Partially imaged right kidney demonstrates outwardly convex margins. Recommend nonemergent follow-up CT abdomen and pelvis with contrast to more   completely assess the right kidney. CT Head WO Contrast   Final Result   No acute intracranial abnormality. XR CHEST PORTABLE   Final Result   Mild dependent left basilar atelectasis. Otherwise no acute cardiopulmonary   disease. CONSULTS:    IP CONSULT TO NEPHROLOGY    ASSESSMENT AND PLAN:      Active Problems:    Essential hypertension, benign    DMII (diabetes mellitus, type 2) (HCC)    Acute metabolic encephalopathy    Sepsis ([address]    Urinary tract infection    Left lower lobe pneumonia (HCC)    Hyponatremia  Resolved Problems:    * No resolved hospital problems. *    Patient is a 68-year-old female with a past medical history of anxiety, depression, headache, hyperlipidemia, hypertension who presented with worsening fatigue cough malaise and fever. Sepsis  -poa with fever, tachycardia and leucocytosis  -ivf   -Blood culture pending    Pneumonia  -Treat as gram-negative/atypical  -On ceftriaxone and azithromycin  -Urine and sputum studies    Bronchitis  -Already on antibiotic    Acute metabolic encephalopathy-improving  -Monitor    UTI  -Culture pending continue antibiotic    Diabetes type 2  -Continue sliding scale insulin    COVID-19-rapid test negative.   With fever respiratory failure pneumonia and encephalopathy will get the PCR    DVT Prophylaxis: lovenox  Diet: DIET CARB CONTROL; Carb Control: 4 carb choices (60 gms)/meal  Code Status: Full Code    PT/OT Eval Status:ordered    Discharge plan -continue care    The patient and / or the family were informed of the results of any tests, a time was given to answer questions, a plan was proposed and they agreed with plan. Discussed with consulting physicians, nursing and social work     The note was completed using EMR. Every effort was made to ensure accuracy; however, inadvertent computerized transcription errors may be present.        Marta Buerger, MD

## 2020-08-04 LAB
ANION GAP SERPL CALCULATED.3IONS-SCNC: 11 MMOL/L (ref 3–16)
ANION GAP SERPL CALCULATED.3IONS-SCNC: 12 MMOL/L (ref 3–16)
ANION GAP SERPL CALCULATED.3IONS-SCNC: 12 MMOL/L (ref 3–16)
ANION GAP SERPL CALCULATED.3IONS-SCNC: 14 MMOL/L (ref 3–16)
ANION GAP SERPL CALCULATED.3IONS-SCNC: 16 MMOL/L (ref 3–16)
BUN BLDV-MCNC: 12 MG/DL (ref 7–20)
BUN BLDV-MCNC: 12 MG/DL (ref 7–20)
BUN BLDV-MCNC: 13 MG/DL (ref 7–20)
BUN BLDV-MCNC: 13 MG/DL (ref 7–20)
BUN BLDV-MCNC: 18 MG/DL (ref 7–20)
CALCIUM SERPL-MCNC: 8.2 MG/DL (ref 8.3–10.6)
CALCIUM SERPL-MCNC: 8.5 MG/DL (ref 8.3–10.6)
CALCIUM SERPL-MCNC: 8.6 MG/DL (ref 8.3–10.6)
CALCIUM SERPL-MCNC: 8.8 MG/DL (ref 8.3–10.6)
CALCIUM SERPL-MCNC: 8.9 MG/DL (ref 8.3–10.6)
CHLORIDE BLD-SCNC: 90 MMOL/L (ref 99–110)
CHLORIDE BLD-SCNC: 91 MMOL/L (ref 99–110)
CHLORIDE BLD-SCNC: 93 MMOL/L (ref 99–110)
CHLORIDE BLD-SCNC: 95 MMOL/L (ref 99–110)
CHLORIDE BLD-SCNC: 97 MMOL/L (ref 99–110)
CO2: 18 MMOL/L (ref 21–32)
CO2: 22 MMOL/L (ref 21–32)
CO2: 23 MMOL/L (ref 21–32)
CO2: 24 MMOL/L (ref 21–32)
CO2: 24 MMOL/L (ref 21–32)
CREAT SERPL-MCNC: 0.6 MG/DL (ref 0.6–1.2)
CREAT SERPL-MCNC: 0.6 MG/DL (ref 0.6–1.2)
CREAT SERPL-MCNC: 0.7 MG/DL (ref 0.6–1.2)
GFR AFRICAN AMERICAN: >60
GFR NON-AFRICAN AMERICAN: >60
GLUCOSE BLD-MCNC: 110 MG/DL (ref 70–99)
GLUCOSE BLD-MCNC: 117 MG/DL (ref 70–99)
GLUCOSE BLD-MCNC: 133 MG/DL (ref 70–99)
GLUCOSE BLD-MCNC: 140 MG/DL (ref 70–99)
GLUCOSE BLD-MCNC: 144 MG/DL (ref 70–99)
GLUCOSE BLD-MCNC: 148 MG/DL (ref 70–99)
GLUCOSE BLD-MCNC: 150 MG/DL (ref 70–99)
GLUCOSE BLD-MCNC: 151 MG/DL (ref 70–99)
GLUCOSE BLD-MCNC: 154 MG/DL (ref 70–99)
L. PNEUMOPHILA SEROGP 1 UR AG: NORMAL
PERFORMED ON: ABNORMAL
POTASSIUM SERPL-SCNC: 3.9 MMOL/L (ref 3.5–5.1)
POTASSIUM SERPL-SCNC: 4.1 MMOL/L (ref 3.5–5.1)
POTASSIUM SERPL-SCNC: 4.2 MMOL/L (ref 3.5–5.1)
POTASSIUM SERPL-SCNC: 4.3 MMOL/L (ref 3.5–5.1)
POTASSIUM SERPL-SCNC: 4.4 MMOL/L (ref 3.5–5.1)
SODIUM BLD-SCNC: 124 MMOL/L (ref 136–145)
SODIUM BLD-SCNC: 127 MMOL/L (ref 136–145)
SODIUM BLD-SCNC: 129 MMOL/L (ref 136–145)
SODIUM BLD-SCNC: 130 MMOL/L (ref 136–145)
SODIUM BLD-SCNC: 132 MMOL/L (ref 136–145)
STREP PNEUMONIAE ANTIGEN, URINE: NORMAL

## 2020-08-04 PROCEDURE — 97530 THERAPEUTIC ACTIVITIES: CPT

## 2020-08-04 PROCEDURE — 6360000002 HC RX W HCPCS: Performed by: STUDENT IN AN ORGANIZED HEALTH CARE EDUCATION/TRAINING PROGRAM

## 2020-08-04 PROCEDURE — 97162 PT EVAL MOD COMPLEX 30 MIN: CPT

## 2020-08-04 PROCEDURE — 2060000000 HC ICU INTERMEDIATE R&B

## 2020-08-04 PROCEDURE — 80048 BASIC METABOLIC PNL TOTAL CA: CPT

## 2020-08-04 PROCEDURE — 36415 COLL VENOUS BLD VENIPUNCTURE: CPT

## 2020-08-04 PROCEDURE — 6370000000 HC RX 637 (ALT 250 FOR IP): Performed by: STUDENT IN AN ORGANIZED HEALTH CARE EDUCATION/TRAINING PROGRAM

## 2020-08-04 PROCEDURE — 2580000003 HC RX 258: Performed by: STUDENT IN AN ORGANIZED HEALTH CARE EDUCATION/TRAINING PROGRAM

## 2020-08-04 PROCEDURE — 6370000000 HC RX 637 (ALT 250 FOR IP): Performed by: INTERNAL MEDICINE

## 2020-08-04 RX ADMIN — INSULIN LISPRO 1 UNITS: 100 INJECTION, SOLUTION INTRAVENOUS; SUBCUTANEOUS at 20:37

## 2020-08-04 RX ADMIN — ACETAMINOPHEN 650 MG: 325 TABLET ORAL at 00:11

## 2020-08-04 RX ADMIN — LOSARTAN POTASSIUM 25 MG: 25 TABLET, FILM COATED ORAL at 08:17

## 2020-08-04 RX ADMIN — ASPIRIN 81 MG: 81 TABLET, COATED ORAL at 08:16

## 2020-08-04 RX ADMIN — Medication 10 ML: at 20:38

## 2020-08-04 RX ADMIN — AZITHROMYCIN MONOHYDRATE 500 MG: 500 INJECTION, POWDER, LYOPHILIZED, FOR SOLUTION INTRAVENOUS at 00:09

## 2020-08-04 RX ADMIN — VENLAFAXINE HYDROCHLORIDE 75 MG: 75 CAPSULE, EXTENDED RELEASE ORAL at 08:17

## 2020-08-04 RX ADMIN — INSULIN LISPRO 1 UNITS: 100 INJECTION, SOLUTION INTRAVENOUS; SUBCUTANEOUS at 18:16

## 2020-08-04 RX ADMIN — CEFTRIAXONE 1 G: 1 INJECTION, POWDER, FOR SOLUTION INTRAMUSCULAR; INTRAVENOUS at 20:37

## 2020-08-04 RX ADMIN — ATORVASTATIN CALCIUM 40 MG: 40 TABLET, FILM COATED ORAL at 08:17

## 2020-08-04 ASSESSMENT — PAIN SCALES - GENERAL
PAINLEVEL_OUTOF10: 0
PAINLEVEL_OUTOF10: 0

## 2020-08-04 NOTE — PROGRESS NOTES
Physical Therapy    Facility/Department: [facility]N PROGRESSIVE CARE  Initial Assessment/Treatment Session    NAME: Mp Patton  : 1945  MRN: [id number]    Date of Service: 2020    Discharge Recommendations:  Patient would benefit from continued therapy after discharge, 24 hour supervision or assist, Home with Home health PT   PT Equipment Recommendations  Equipment Needed: No    Assessment   Body structures, Functions, Activity limitations: Decreased functional mobility ; Decreased strength;Decreased safe awareness;Decreased cognition;Decreased balance  Assessment: Pt is a 70 y.o. F. admitted  for sepsis, acute encephalopathy, PNA, COVID negative. She presents mildly confused, having difficulty following commands for strength/ROM testing, and being partially oriented to situation. However, she did well to ambulate in room with walker support, SBA, but was mildly limited d/t SOB and fatigue. She would benefit from continued therapy to improve her balance, strength, safety awareness, and endurance. Anticipate safe return home with initial 24/ assist from son, regular use of walker, and home PT level 1. Mp Patton scored a 18/24 on the AM-PAC short mobility form. Current research shows that an AM-PAC score of 18 or greater is typically associated with a discharge to the patient's home setting. Based on the patient's AM-PAC score and their current functional mobility deficits, it is recommended that the patient have 2-3 sessions per week of Physical Therapy at d/c to increase the patient's independence. At this time, this patient demonstrates the endurance and safety to discharge home with home PT and a follow up treatment frequency of 2-3x/wk. Please see assessment section for further patient specific details. If patient discharges prior to next session this note will serve as a discharge summary. Please see below for the latest assessment towards goals.      Specific instructions for Next Treatment: Improve balance, strength, and endurance  Prognosis: Good  Decision Making: Medium Complexity  History: See below  Exam: Strength; ROM; Balance; Ambulation  Clinical Presentation: Evolving  Barriers to Learning: Fatigue; Confusion  REQUIRES PT FOLLOW UP: Yes  Activity Tolerance  Activity Tolerance: Patient limited by fatigue       Patient Diagnosis(es): The primary encounter diagnosis was Urinary tract infection with hematuria, site unspecified. Diagnoses of Sepsis, due to unspecified organism, unspecified whether acute organ dysfunction present ([address] Hyponatremia, and Hyperglycemia were also pertinent to this visit. has a past medical history of Anxiety, Depression, Headache(784.0), Hyperlipidemia, Hypertension, and Insomnia. has a past surgical history that includes Hysterectomy. Restrictions  Restrictions/Precautions  Restrictions/Precautions: Fall Risk     Vision/Hearing  Vision: Impaired  Vision Exceptions: (Cannot drive at night d/t blurry vision)  Hearing: Within functional limits       Subjective  General  Chart Reviewed: Yes  Additional Pertinent Hx: Per Dr. Davis Degree is a 77-year-old female with a past medical history of anxiety, depression, headache, hyperlipidemia, hypertension who presented with worsening fatigue cough malaise and fever. \"  Response To Previous Treatment: Not applicable  Referring Practitioner: Dr. Galileo Larose  Referral Date : 08/03/20  Diagnosis: Sepsis; UTI; PNA; Acute encephalopathy; COVID negative  Follows Commands: Within Functional Limits  Subjective  Subjective: Pt on room air. Denies pain. Agreeable to evaluation. Pain Screening  Patient Currently in Pain: No    Orientation  Orientation  Overall Orientation Status: Impaired(Partially oriented to situation)  Orientation Level: Oriented to person;Oriented to place; Disoriented to time     Social/Functional History  Social/Functional History  Lives With: Son  Type of Home: House  Home Layout: One level  Home Access: Stairs to enter without rails  Entrance Stairs - Number of Steps: 2  Bathroom Shower/Tub: Tub/Shower unit  Bathroom Toilet: Standard  Home Equipment: 4 wheeled walker  ADL Assistance: Independent  Homemaking Assistance: (Son performs cooking and cleaning)  Ambulation Assistance: Independent(Uses 4WW as needed)  Transfer Assistance: Independent  Active : Yes  Mode of Transportation: Car  Occupation: Retired  Additional Comments: Denies hx of falls    Objective    AROM RLE (degrees)  RLE AROM: WFL  RLE General AROM: HIp Flex, Knee Flex/Ext, and Ankle PF/DF WFL  AROM LLE (degrees)  LLE AROM : WFL  LLE General AROM: HIp Flex, Knee Flex/Ext, and Ankle PF/DF WFL  AROM RUE (degrees)  RUE AROM : WFL  RUE General AROM: Shoulder Flex, Elbow Flex/ext WFL  AROM LUE (degrees)  LUE AROM : WFL  LUE General AROM: Shoulder Flex, Elbow Flex/ext WFL     Strength RLE  Strength RLE: Exception  Comment: Hip Flex 3/5, Knee Flex/Ext 3+/5, Ankle PF/DF at least 3/5  Strength LLE  Strength LLE: WFL  Comment: Hip Flex, Knee Flex/Ext, and ANkle PF/DF grossly 4/5  Strength RUE  Strength RUE: WFL  Comment: Shoulder Flex 3+/5, Elbow Flex/Ext 4/5  Strength LUE  Strength LUE: WFL  Comment: Shoulder Flex 3+/5, Elbow Flex/Ext 4/5     Tone RLE  RLE Tone: Normotonic  Tone LLE  LLE Tone: Normotonic  Motor Control  Gross Motor?: WFL     Sensation  Overall Sensation Status: WFL     Transfers  Sit to Stand: Contact guard assistance  Stand to sit: Contact guard assistance     Ambulation  Ambulation?: Yes  More Ambulation?: Yes  Ambulation 1  Surface: level tile  Device: No Device  Assistance: Contact guard assistance  Quality of Gait: Pt mildly unsteady ambulating with narrow CELE, slow speed, increased lateral sway, no overt LOB. She became significantly SOB with activity ().   Distance: 40', 25'    Ambulation 2  Surface - 2: level tile  Device 2: [address] [facility] 2: Stand by assistance  Quality of Gait 2: Pt demonstrated improved stability when using walker for support, moving with step-through pattern, erect posture, no LOB. Distance: 15'      Plan   Plan  Times per week: 2-3x  Specific instructions for Next Treatment: Improve balance, strength, and endurance  Safety Devices  Type of devices: All fall risk precautions in place, Left in chair, Call light within reach, Chair alarm in place, Gait belt, Nurse notified  Restraints  Initially in place: No    AM-PAC Score  AM-PAC Inpatient Mobility Raw Score : 18 (08/04/20 1017)  AM-PAC Inpatient T-Scale Score : 43.63 (08/04/20 1017)  Mobility Inpatient CMS 0-100% Score: 46.58 (08/04/20 1017)  Mobility Inpatient CMS G-Code Modifier : CK (08/04/20 1017)          Goals  Short term goals  Time Frame for Short term goals: In 2-3 days pt will perform  Short term goal 1: Bed mobility Mod I  Short term goal 2: Transfers Mod i  Short term goal 3: Ambulation 48' with walker, Mod I  Short term goal 4: Ascend/Descend 2 steps with CGA  Long term goals  Time Frame for Long term goals : LTG = STG  Patient Goals   Patient goals : to return home with her son       Therapy Time   Individual Concurrent Group Co-treatment   Time In 0956         Time Out 1020         Minutes 24         Timed Code Treatment Minutes: 9 Minutes   Evaluation time: 15 min.      Sher Martinez PT    Electronically signed by Sher Martinez PT [id number] on 8/4/2020 at 10:22 AM

## 2020-08-04 NOTE — PROGRESS NOTES
Progress Note  Admit Date: 8/2/2020      PCP: Navneet Vasquez MD     CC: F/U for fever    Days in hospital:  2    SUBJECTIVE / Interval History:  Patient feels okay. Improved from yesterday   Oriented at present      Allergies  Codeine and Sulfa antibiotics    Medications    Scheduled Meds:   sodium chloride flush  10 mL Intravenous 2 times per day    enoxaparin  40 mg Subcutaneous Daily    aspirin  81 mg Oral Daily    cefTRIAXone (ROCEPHIN) IV  1 g Intravenous Q24H    insulin lispro  0-6 Units Subcutaneous TID WC    insulin lispro  0-3 Units Subcutaneous Nightly    losartan  25 mg Oral Daily    atorvastatin  40 mg Oral Daily    venlafaxine  75 mg Oral Daily    azithromycin  500 mg Intravenous Q24H     Continuous Infusions:   dextrose         PRN Meds:  acetaminophen, sodium chloride flush, ondansetron, glucose, dextrose, glucagon (rDNA), dextrose    Vitals    /66   Pulse 91   Temp 99.7 °F (37.6 °C) (Oral)   Resp 18   Ht 5' 6\" (1.676 m)   Wt 180 lb 1.9 oz (81.7 kg)   SpO2 93%   BMI 29.07 kg/m²     Exam:    Gen: No distress. Eyes: PERRL. No sclera icterus. No conjunctival injection. ENT: No discharge. Pharynx clear. External appearance of ears and nose normal.  Neck: Trachea midline. No obvious mass. Resp: No accessory muscle use. No crackles. No wheezes. No rhonchi. No dullness on percussion. CV: Regular rate. Regular rhythm. No murmur or rub. No edema. GI: Non-tender. Non-distended. No hernia. Skin: Warm, dry, normal texture and turgor. No nodule on exposed extremities. Lymph: No cervical LAD. No supraclavicular LAD. M/S: No cyanosis. No clubbing. No joint deformity. Neuro: Moves all four extremities. CN 2-12 tested, no defect noted. Psych: Oriented x 3. No anxiety. Awake. Alert. Intact judgement and insight.     Data    LABS  CBC:   Recent Labs     08/02/20  1818 08/03/20  0354   WBC 15.9* 13.7*   HGB 14.8 13.8   HCT 44.5 41.5   MCV 91.9 90.6    205     BMP: Recent Labs     08/03/20  1137 08/04/20  0323 08/04/20  0727   * 130* 132*   K 3.9 3.9 4.1   CL 98* 95* 97*   CO2 20* 23 24   BUN 14 13 12   CREATININE 0.8 0.7 0.6   GLUCOSE 118* 154* 117*     POC GLUCOSE:    Recent Labs     08/03/20  0733 08/03/20  1202 08/03/20  1643 08/03/20  2116 08/04/20  0759   POCGLU 148* 111* 126* 139* 110*     LIVER PROFILE:   Recent Labs     08/03/20  0354   AST 80*   ALT 29   LABALBU 3.3*   BILITOT 0.9   ALKPHOS 43     PT/INR: No results for input(s): PROTIME, INR in the last 72 hours. APTT: No results for input(s): APTT in the last 72 hours. UA:  Recent Labs     08/02/20  1820   COLORU YELLOW   PHUR 6.0   WBCUA 138*   RBCUA 3   CLARITYU CLOUDY*   SPECGRAV 1.023   LEUKOCYTESUR MODERATE*   UROBILINOGEN 1.0   BILIRUBINUR SMALL*   BLOODU MODERATE*   GLUCOSEU Negative   KETUA TRACE*   AMORPHOUS 2+     Microbiology:  Wound Culture: No results for input(s): WNDABS, ORG in the last 72 hours. Invalid input(s):  LABGRAM  Nasal Culture: No results for input(s): ORG, MRSAPCR in the last 72 hours. Blood Culture:   Recent Labs     08/02/20  1820 08/02/20  2358   BC  --  No Growth to date. Any change in status will be called. BLOODCULT2 No Growth to date. Any change in status will be called. --      Fungal Culture:   No results for input(s): FUNGSM in the last 72 hours. No results for input(s): FUNCXBLD in the last 72 hours. CSF Culture:  No results for input(s): COLORCSF, APPEARCSF, CFTUBE, CLOTCSF, WBCCSF, RBCCSF, NEUTCSF, NUMCELLSCSF, LYMPHSCSF, MONOCSF, GLUCCSF, VOLCSF in the last 72 hours. Respiratory Culture:  No results for input(s): [place] in the last 72 hours. AFB:No results for input(s): AFBSMEAR in the last 72 hours. Urine Culture  Recent Labs     08/02/20  1820   LABURIN >50,000 CFU/ml mixed skin/urogenital dana. No further workup       RADIOLOGY:    CT CHEST WO CONTRAST   Final Result   Left lower lobe pneumonia.   Recommend follow-up PA and lateral chest

## 2020-08-05 VITALS
OXYGEN SATURATION: 90 % | WEIGHT: 179.01 LBS | SYSTOLIC BLOOD PRESSURE: 93 MMHG | HEIGHT: 66 IN | DIASTOLIC BLOOD PRESSURE: 54 MMHG | HEART RATE: 68 BPM | BODY MASS INDEX: 28.77 KG/M2 | RESPIRATION RATE: 18 BRPM | TEMPERATURE: 98 F

## 2020-08-05 LAB
ANION GAP SERPL CALCULATED.3IONS-SCNC: 11 MMOL/L (ref 3–16)
ANION GAP SERPL CALCULATED.3IONS-SCNC: 14 MMOL/L (ref 3–16)
ANION GAP SERPL CALCULATED.3IONS-SCNC: 14 MMOL/L (ref 3–16)
BUN BLDV-MCNC: 14 MG/DL (ref 7–20)
BUN BLDV-MCNC: 16 MG/DL (ref 7–20)
BUN BLDV-MCNC: 16 MG/DL (ref 7–20)
CALCIUM SERPL-MCNC: 8.2 MG/DL (ref 8.3–10.6)
CALCIUM SERPL-MCNC: 8.4 MG/DL (ref 8.3–10.6)
CALCIUM SERPL-MCNC: 8.6 MG/DL (ref 8.3–10.6)
CHLORIDE BLD-SCNC: 90 MMOL/L (ref 99–110)
CHLORIDE BLD-SCNC: 91 MMOL/L (ref 99–110)
CHLORIDE BLD-SCNC: 93 MMOL/L (ref 99–110)
CO2: 22 MMOL/L (ref 21–32)
CO2: 22 MMOL/L (ref 21–32)
CO2: 24 MMOL/L (ref 21–32)
CREAT SERPL-MCNC: 0.6 MG/DL (ref 0.6–1.2)
CREAT SERPL-MCNC: 0.6 MG/DL (ref 0.6–1.2)
CREAT SERPL-MCNC: 0.7 MG/DL (ref 0.6–1.2)
GFR AFRICAN AMERICAN: >60
GFR NON-AFRICAN AMERICAN: >60
GLUCOSE BLD-MCNC: 119 MG/DL (ref 70–99)
GLUCOSE BLD-MCNC: 126 MG/DL (ref 70–99)
GLUCOSE BLD-MCNC: 128 MG/DL (ref 70–99)
GLUCOSE BLD-MCNC: 131 MG/DL (ref 70–99)
GLUCOSE BLD-MCNC: 136 MG/DL (ref 70–99)
GLUCOSE BLD-MCNC: 150 MG/DL (ref 70–99)
PERFORMED ON: ABNORMAL
POTASSIUM SERPL-SCNC: 3.8 MMOL/L (ref 3.5–5.1)
POTASSIUM SERPL-SCNC: 3.8 MMOL/L (ref 3.5–5.1)
POTASSIUM SERPL-SCNC: 4.1 MMOL/L (ref 3.5–5.1)
SODIUM BLD-SCNC: 125 MMOL/L (ref 136–145)
SODIUM BLD-SCNC: 127 MMOL/L (ref 136–145)
SODIUM BLD-SCNC: 129 MMOL/L (ref 136–145)

## 2020-08-05 PROCEDURE — 6360000002 HC RX W HCPCS: Performed by: STUDENT IN AN ORGANIZED HEALTH CARE EDUCATION/TRAINING PROGRAM

## 2020-08-05 PROCEDURE — 6370000000 HC RX 637 (ALT 250 FOR IP): Performed by: STUDENT IN AN ORGANIZED HEALTH CARE EDUCATION/TRAINING PROGRAM

## 2020-08-05 PROCEDURE — 80048 BASIC METABOLIC PNL TOTAL CA: CPT

## 2020-08-05 PROCEDURE — 94761 N-INVAS EAR/PLS OXIMETRY MLT: CPT

## 2020-08-05 PROCEDURE — 36415 COLL VENOUS BLD VENIPUNCTURE: CPT

## 2020-08-05 PROCEDURE — 2580000003 HC RX 258: Performed by: STUDENT IN AN ORGANIZED HEALTH CARE EDUCATION/TRAINING PROGRAM

## 2020-08-05 RX ORDER — AZITHROMYCIN 250 MG/1
500 TABLET, FILM COATED ORAL DAILY
Qty: 6 TABLET | Refills: 0 | Status: SHIPPED | OUTPATIENT
Start: 2020-08-05 | End: 2020-08-08

## 2020-08-05 RX ORDER — CEFUROXIME AXETIL 250 MG/1
250 TABLET ORAL 2 TIMES DAILY
Qty: 6 TABLET | Refills: 0 | Status: SHIPPED | OUTPATIENT
Start: 2020-08-05 | End: 2020-08-08

## 2020-08-05 RX ADMIN — ASPIRIN 81 MG: 81 TABLET, COATED ORAL at 09:05

## 2020-08-05 RX ADMIN — VENLAFAXINE HYDROCHLORIDE 75 MG: 75 CAPSULE, EXTENDED RELEASE ORAL at 09:05

## 2020-08-05 RX ADMIN — Medication 10 ML: at 09:06

## 2020-08-05 RX ADMIN — INSULIN LISPRO 1 UNITS: 100 INJECTION, SOLUTION INTRAVENOUS; SUBCUTANEOUS at 14:06

## 2020-08-05 RX ADMIN — AZITHROMYCIN MONOHYDRATE 500 MG: 500 INJECTION, POWDER, LYOPHILIZED, FOR SOLUTION INTRAVENOUS at 01:21

## 2020-08-05 RX ADMIN — ATORVASTATIN CALCIUM 40 MG: 40 TABLET, FILM COATED ORAL at 09:05

## 2020-08-05 RX ADMIN — LOSARTAN POTASSIUM 25 MG: 25 TABLET, FILM COATED ORAL at 09:05

## 2020-08-05 RX ADMIN — ENOXAPARIN SODIUM 40 MG: 40 INJECTION SUBCUTANEOUS at 09:05

## 2020-08-05 ASSESSMENT — PAIN SCALES - GENERAL: PAINLEVEL_OUTOF10: 0

## 2020-08-05 NOTE — CARE COORDINATION
Met with pt in the room to discuss transport home today. Pt attempted to call son Golria Winters while this sw was in the room and left son a voice message.  Pt received call from emili

## 2020-08-05 NOTE — CARE COORDINATION
Met with pt to discuss home health care options. Provided pt with [address] list, pt chose [facility]. Discussed transportation home this afternoon, pt reports annie Hamilton to transport home and requests SW call son to inform of discharge today. Sw faxed referral to [facility]. Will discuss with MD the need for Home Care orders. IMM discussed with patient. The Plan for Transition of Care is related to the following treatment goals: Independent at home    The Patient  was provided with a choice of provider and agrees   with the discharge plan. [x] Yes [] No    Freedom of choice list was provided with basic dialogue that supports the patient's individualized plan of care/goals, treatment preferences and shares the quality data associated with the providers. [x] Yes [] No      Electronically signed by KAUSHIK Sarabia LSW on 8/5/2020 at 10:51 AM      LM with annie Hamilton [phone]) to inform of pt discharge. Electronically signed by KAUSHIK Sarabia LSW on 8/5/2020 at 12:18 PM      Kirk attempted to reach pt annie Hamilton at the following phone numbers: [phone], [phone], [phone]  Left un identifying message requesting return call. Electronically signed by KAUSHIK Sarabia LSW on 8/5/2020 at 2:25 PM      [facility] unable to accept pt insurance. Discussed options under insurance plan. Pt in agreement with [facility]. REINA, facesheet, orders faxed to [phone].      Electronically signed by KAUSHIK Sarabia LSW on 8/5/2020 at 3:54 PM

## 2020-08-05 NOTE — PROGRESS NOTES
Progress Note  Admit Date: 8/2/2020      PCP: Escobar Strong MD     CC: F/U for fever    Days in hospital:  3    SUBJECTIVE / Interval History:  Patient feels okay. Improved from yesterday    wanting to go home      Allergies  Codeine and Sulfa antibiotics    Medications    Scheduled Meds:   sodium chloride flush  10 mL Intravenous 2 times per day    enoxaparin  40 mg Subcutaneous Daily    aspirin  81 mg Oral Daily    cefTRIAXone (ROCEPHIN) IV  1 g Intravenous Q24H    insulin lispro  0-6 Units Subcutaneous TID WC    insulin lispro  0-3 Units Subcutaneous Nightly    losartan  25 mg Oral Daily    atorvastatin  40 mg Oral Daily    venlafaxine  75 mg Oral Daily    azithromycin  500 mg Intravenous Q24H     Continuous Infusions:   dextrose         PRN Meds:  acetaminophen, sodium chloride flush, ondansetron, glucose, dextrose, glucagon (rDNA), dextrose    Vitals    /67   Pulse 82   Temp 98.7 °F (37.1 °C) (Oral)   Resp 16   Ht 5' 6\" (1.676 m)   Wt 179 lb 0.2 oz (81.2 kg)   SpO2 92%   BMI 28.89 kg/m²     Exam:    Gen: No distress. Eyes: PERRL. No sclera icterus. No conjunctival injection. ENT: No discharge. Pharynx clear. External appearance of ears and nose normal.  Neck: Trachea midline. No obvious mass. Resp: No accessory muscle use. No crackles. No wheezes. No rhonchi. No dullness on percussion. CV: Regular rate. Regular rhythm. No murmur or rub. No edema. GI: Non-tender. Non-distended. No hernia. Skin: Warm, dry, normal texture and turgor. No nodule on exposed extremities. Lymph: No cervical LAD. No supraclavicular LAD. M/S: No cyanosis. No clubbing. No joint deformity. Neuro: Moves all four extremities. CN 2-12 tested, no defect noted. Psych: Oriented x 3. No anxiety. Awake. Alert. Intact judgement and insight.     Data    LABS  CBC:   Recent Labs     08/02/20  1818 08/03/20  0354   WBC 15.9* 13.7*   HGB 14.8 13.8   HCT 44.5 41.5   MCV 91.9 90.6    205     BMP: radiographs in 8-12 weeks. 10 mm average diameter solid nodule in the subpleural right lower lobe is not   substantially changed since 09/11/2018, most likely a prominent benign   parenchymal lymph node. Recommend follow-up CT chest in 6-12 months. Moderate centrilobular and paraseptal emphysema. Diffuse airway inflammation   may be related to smoking or reflect bronchitis. Stable benign left adrenal adenomas. Partially imaged right kidney demonstrates outwardly convex margins. Recommend nonemergent follow-up CT abdomen and pelvis with contrast to more   completely assess the right kidney. CT Head WO Contrast   Final Result   No acute intracranial abnormality. XR CHEST PORTABLE   Final Result   Mild dependent left basilar atelectasis. Otherwise no acute cardiopulmonary   disease. CONSULTS:    None    ASSESSMENT AND PLAN:      Active Problems:    Essential hypertension, benign    DMII (diabetes mellitus, type 2) (HCC)    Acute metabolic encephalopathy    Sepsis ([facility] [address]    Urinary tract infection    Left lower lobe pneumonia (HCC)    Hyponatremia  Resolved Problems:    * No resolved hospital problems. *    Patient is a 72-year-old female with a past medical history of anxiety, depression, headache, hyperlipidemia, hypertension who presented with worsening fatigue cough malaise and fever. She was admitted with sepsis secondary to pneumonia with bronchitis. Sepsis- improving   -poa with fever, tachycardia and leucocytosis  -Blood culture ngtd    Pneumonia, dc on po abx  -Treat as gram-negative/atypical  -On ceftriaxone and azithromycin  -Urine and sputum studies    Bronchitis  -Already on antibiotic    Acute metabolic encephalopathy-improving  -Monitor    UTI- ruled out   -Culture skin dana    Hyponatremia  -Secondary to sepsis improving      Diabetes type 2  -Continue sliding scale insulin    COVID-19- negative     Goals of care discussed with patient.   Patient

## 2020-08-05 NOTE — PROGRESS NOTES
Physician Progress Note      Pierce Simpson  CSN #:                  [id number]  :                       1945  ADMIT DATE:       2020 5:38 PM  [address] DATE:  RESPONDING  PROVIDER #:        Jodi Devries MD          QUERY TEXT:    Pt admitted with Sepsis, Pneumonia and UTI. Pt noted to have some confusion in   H and P and forgetfulness  in RN note and  slightly disoriented  in ED   note. If possible, please document in the progress notes and discharge summary   if you are evaluating and / or treating any of the following: The medical record reflects the following:  Risk Factors: Current Sepsis, UTI and Pneumonia, fever  Clinical Indicators:  ED record notes Patient appears slightly disoriented   upon presentation, and confusion noted on assessment. H and P notes patient   seemed to be somewhat more confused compared to what was read in the ED note.  RN notes some confusion and forgetfulness  Treatment: Monitor and assess, reorient and use of bed alarm  Options provided:  -- Metabolic encephalopathy  -- Septic encephalopathy  -- Delirium, Please specify cause  -- Other - I will add my own diagnosis  -- Disagree - Not applicable / Not valid  -- Disagree - Clinically unable to determine / Unknown  -- Refer to Clinical Documentation Reviewer    PROVIDER RESPONSE TEXT:    This patient has metabolic encephalopathy. Query created by:  [id number] on 8/3/2020 10:42 AM      Electronically signed by:  Jodi Devries MD 2020 9:14 AM

## 2020-08-05 NOTE — DISCHARGE SUMMARY
Hospital Medicine Discharge Summary      Patient ID: Shaun Flower      Patient's PCP: Norman Avery MD    Admit Date: 8/2/2020     Discharge Date: 8/5/2020  The patient was seen and examined on day of discharge and this discharge summary is in conjunction with any daily progress note from day of discharge. Admitting Physician: Edward Mcdaniel DO    Discharge Physician: Marta Buerger, MD     Admitted for   Chief Complaint   Patient presents with    Cough     starting today    Extremity Weakness    Fever       Admitting Diagnosis Sepsis ([address] [A41.9]    Discharge Diagnoses: Active Hospital Problems    Diagnosis Date Noted    Hyponatremia [E87.1] 08/03/2020    Sepsis ([address] [A41.9] 08/02/2020    Urinary tract infection [N39.0] 08/02/2020    Left lower lobe pneumonia ([address] [J18.1] 08/02/2020    DMII (diabetes mellitus, type 2) ([facility] [address] [E11.9] 09/11/2018    Acute metabolic encephalopathy [L68.46] 09/11/2018    Essential hypertension, benign [I10] 03/01/2012       Follow Up: Primary Care Physician in one week    PCP to Follow up on   Diaz Restrepo Course:     Patient is a 79-year-old female with a past medical history of anxiety, depression, headache, hyperlipidemia, hypertension who presented with worsening fatigue cough malaise and fever. She was admitted with sepsis secondary to pneumonia with bronchitis.     Sepsis- improving   -poa with fever, tachycardia and leucocytosis  -Blood culture ngtd     Pneumonia, dc on po abx  -Treat as gram-negative/atypical  -On ceftriaxone and azithromycin  -Urine and sputum studies     Bronchitis  -Already on antibiotic     Acute metabolic encephalopathy-improving  -Monitor     UTI- ruled out   -Culture skin dana    HTN  -blood pressure well controlled in the hospital of clonidine.   Will stop on discharge     Hyponatremia  -Secondary to sepsis improving        Diabetes type 2  -Continue sliding scale insulin     COVID-19- negative Consults:     None        Disposition: home    Discharged Condition: Stable    Code Status: Full Code    Activity: activity as tolerated    Diet: diabetic        Labs: For convenience and continuity at follow-up the following most recent labs are provided:    CBC:   Lab Results   Component Value Date    WBC 13.7 08/03/2020    HGB 13.8 08/03/2020    HCT 41.5 08/03/2020     08/03/2020       RENAL:   Lab Results   Component Value Date     08/05/2020    K 4.1 08/05/2020    K 5.0 08/02/2020    CL 91 08/05/2020    CO2 22 08/05/2020    BUN 16 08/05/2020    CREATININE 0.6 08/05/2020           Discharge Medications:    Terrie Portilloist   Home Medication Instructions [facility]:[id number]    Printed on:08/05/20 5617   Medication Information                      albuterol sulfate HFA (VENTOLIN HFA) 108 (90 Base) MCG/ACT inhaler  Inhale 2 puffs into the lungs 4 times daily             atorvastatin (LIPITOR) 20 MG tablet  Take 20 mg by mouth nightly             azithromycin (ZITHROMAX) 250 MG tablet  Take 2 tablets by mouth daily for 3 days             cefUROXime (CEFTIN) 250 MG tablet  Take 1 tablet by mouth 2 times daily for 3 days             clonazepam (KLONOPIN) 1 MG tablet  Take 1 tablet by mouth 2 times daily as needed for Anxiety. glipiZIDE (GLUCOTROL XL) 10 MG extended release tablet  Take 10 mg by mouth daily             losartan (COZAAR) 25 MG tablet  Take 25 mg by mouth daily             Multiple Vitamins-Minerals (CEROVITE ADVANCED FORMULA PO)  Take  by mouth.               pioglitazone (ACTOS) 30 MG tablet  Take 30 mg by mouth daily             Polysaccharide Iron Complex (PROFE) 391.3 (180 Fe) MG CAPS  Take 1 capsule by mouth 2 times daily             senna (SENOKOT) 8.6 MG tablet  Take 2 tablets by mouth daily.              venlafaxine (EFFEXOR XR) 37.5 MG extended release capsule  Take 37.5 mg by mouth daily              VITAMIN D, CHOLECALCIFEROL, PO  Take 10,000 Units by mouth once a week                 No future appointments. Time Spent on discharge is more than 45 minutes in the examination, evaluation, counseling and review of medications and discharge plan. Signed:  Allyson Pratt MD   8/5/2020    The note was completed using EMR. Every effort was made to ensure accuracy; however, inadvertent computerized transcription errors may be present. Thank you Erika Mckeon MD for the opportunity to be involved in this patient's care. If you have any questions or concerns please feel free to contact me at [phone].

## 2020-08-05 NOTE — DISCHARGE INSTR - COC
Continuity of Care Form    Patient Name: Lisandro Asher   :  1945  MRN:  [id number]    Admit date:  2020  Discharge date:  ***    Code Status Order: Full Code   Advance Directives:   Advance Care Flowsheet Documentation     Date/Time Healthcare Directive Type of Healthcare Directive Copy in [address] Agent's Name Healthcare Agent's Phone Number    20 [phone]  No, patient does not have an advance directive for healthcare treatment -- -- -- -- --          Admitting Physician:  Laurita Troy DO  PCP: Roger Moore MD    Discharging Nurse: [facility] Unit/Room#: L9J-1471/2925-76  Discharging Unit Phone Number: ***    Emergency Contact:   Extended Emergency Contact Information  Primary Emergency Contact: Cameron Thomas  Address: [address] Phone: [phone]  Relation: Child  Secondary Emergency Contact: Jovan Ng [address] [address] Phone: [phone]  Mobile Phone: [phone]  Relation: Other    Past Surgical History:  Past Surgical History:   Procedure Laterality Date    HYSTERECTOMY         Immunization History: There is no immunization history on file for this patient.     Active Problems:  Patient Active Problem List   Diagnosis Code    Depressive disorder, not elsewhere classified F32.9    Essential hypertension, benign I10    Type II or unspecified type diabetes mellitus without mention of complication, not stated as uncontrolled E11.9    Hyperlipidemia E78.5    Reflux K21.9    Weight loss R63.4    Osteoporosis M81.0    Benign skin lesion of neck L98.9    Stroke, acute, embolic ([address] P60.8    DMII (diabetes mellitus, type 2) ([address] E11.9    Accidental medication overdose, initial encounter T50.901A    Acute metabolic encephalopathy B99.57    GI bleed K92.2    Suicide attempt ([address] T14.91XA    Acute adjustment disorder with disturbance of conduct F43.24    Sepsis ([facility] [address] A41.9    Urinary tract infection N39.0    Left lower lobe pneumonia ([facility] [address] J18.1    Hyponatremia E87.1       Isolation/Infection:   Isolation          No Isolation        Patient Infection Status     Infection Onset Added Last Indicated Last Indicated By Review Planned Expiration Resolved Resolved By    None active    Resolved    COVID-19 Rule Out 20 COVID-19 (Ordered)   20 Rule-Out Test Resulted    COVID-19 Rule Out 20 COVID-19 (Ordered)   20 Rule-Out Test Resulted          Nurse Assessment:  Last Vital Signs: BP (!) 145/78   Pulse 76   Temp 99.4 °F (37.4 °C) (Oral)   Resp 18   Ht 5' 6\" (1.676 m)   Wt 179 lb 0.2 oz (81.2 kg)   SpO2 90%   BMI 28.89 kg/m²     Last documented pain score (0-10 scale): Pain Level: 0  Last Weight:   Wt Readings from Last 1 Encounters:   20 179 lb 0.2 oz (81.2 kg)     Mental Status:  {IP PT MENTAL STATUS:}    IV Access:  { REINA IV ACCESS:568142995}    Nursing Mobility/ADLs:  Walking   {CHP DME LTEK:711282669}  Transfer  {CHP DME OFW}  Bathing  {CHP DME PWXP:780657531}  Dressing  {CHP DME IHLE:184328240}  Toileting  {CHP DME BJNF:052531268}  Feeding  {P DME YMDO:187032458}  Med Admin  {P DME XRAQ:229635899}  Med Delivery   { REINA MED Delivery:061991495}    Wound Care Documentation and Therapy:        Elimination:  Continence:   · Bowel: {YES / ZY:67332}  · Bladder: {YES / NR:31945}  Urinary Catheter: {Urinary Catheter:682266636}   Colostomy/Ileostomy/Ileal Conduit: {YES / UM:74392}       Date of Last BM: ***    Intake/Output Summary (Last 24 hours) at 2020 1219  Last data filed at 2020 1747  Gross per 24 hour   Intake 440 ml   Output --   Net 440 ml     I/O last 3 completed shifts:   In: 620 [P.O.:620]  Out: -     Safety Concerns:     [address] Safety Concerns:905413182}    Impairments/Disabilities:      [address]

## 2020-08-06 LAB — CULTURE, BLOOD 2: NORMAL

## 2020-08-07 LAB — BLOOD CULTURE, ROUTINE: NORMAL

## 2020-08-08 LAB — BLOOD CULTURE, ROUTINE: NORMAL

## 2020-09-01 PROBLEM — N39.0 URINARY TRACT INFECTION: Status: RESOLVED | Noted: 2020-08-02 | Resolved: 2020-09-01

## 2020-11-03 PROBLEM — E11.9 DMII (DIABETES MELLITUS, TYPE 2) (HCC): Status: RESOLVED | Noted: 2018-09-11 | Resolved: 2020-11-03

## 2021-02-05 RX ORDER — ASPIRIN 81 MG/1
81 TABLET ORAL DAILY
COMMUNITY

## 2021-02-05 NOTE — PROGRESS NOTES
[address]  time__1040__________        Surgery time__1210__________    Take the following medications with a sip of water: Losartan    Do not eat or drink anything after 12:00 midnight prior to your surgery. H&P 2/8/2021 Dr. Darci Mayorga CNP  This includes water chewing gum, mints and ice chips. You may brush your teeth and gargle the morning of your surgery, but do not swallow the water     Please see your family doctor/pediatrician for a history and physical and/or concerning medications. Bring any test results/reports from your physicians office. If you are under the care of a heart doctor or specialist doctor, please be aware that you may be asked to them for clearance    You may be asked to stop blood thinners such as Coumadin, Plavix, Fragmin, Lovenox, etc., or any anti-inflammatories such as:  Aspirin, Ibuprofen, Advil, Naproxen prior to your surgery. We also ask that you stop any OTC medications such as fish oil, vitamin E, glucosamine, garlic, Multivitamins, COQ 10, etc.    We ask that you do not smoke 24 hours prior to surgery  We ask that you do not  drink any alcoholic beverages 24 hours prior to surgery     You must make arrangements for a responsible adult to take you home after your surgery. For your safety you will not be allowed to leave alone or drive yourself home. Your surgery will be cancelled if you do not have a ride home. Also for your safety, it is strongly suggested that someone stay with you the first 24 hours after your surgery. A parent or legal guardian must accompany a child scheduled for surgery and plan to stay at the hospital until the child is discharged. Please do not bring other children with you. For your comfort, please wear simple loose fitting clothing to the hospital.  Please do not bring valuables. Wear short sleeve button down shirt or loose fitting shirt. Bring eye drops or ointment. Do not wear any make-up or nail polish on your fingers or toes      For your safety, please do not wear any jewelry or body piercing's on the day of surgery. All jewelry must be removed. If you have dentures, they will be removed before going to operating room. For your convenience, we will provide you with a container. If you wear contact lenses or glasses, they will be removed, please bring a case for them. If you have a living will and a durable power of  for healthcare, please bring in a copy. As part of our patient safety program to minimize surgical site infections, we ask you to do the following:    · Please notify your surgeon if you develop any illness between         now and the  day of your surgery. · This includes a cough, cold, fever, sore throat, nausea,         or vomiting, and diarrhea, etc.  ·  Please notify your surgeon if you experience dizziness, shortness         of breath or blurred vision between now and the time of your surgery. Do not shave your operative site 96 hours prior to surgery. For face and neck surgery, men may use an electric razor 48 hours   prior to surgery. You may shower the night before surgery or the morning of   your surgery with an antibacterial soap. You will need to bring a photo ID and insurance card    [facility] has an onsite pharmacy, would you like to utilize our pharmacy     If you will be staying overnight and use a C-pap machine, please bring   your C-pap to hospital     Our goal is to provide you with excellent care, therefore, visitors will be limited to two(2) in the room at a time so that we may focus on providing this care for you. Please contact pre-admission testing if you have any further questions. [facility] phone number:  [phone]  Please note these are generalized instructions for all surgical cases, you may be provided with more specific instructions according to your surgery.

## 2021-02-05 NOTE — PROGRESS NOTES
Preoperative Screening for Elective Surgery/Invasive Procedures While COVID-19 present in the community    ? Have you tested positive or have been told to self-isolate for COVID-19 like symptoms within the past 28 days? ? Do you currently have any of the following symptoms? o Fever >100.0 F or 99.9 F in immunocompromised patients? o New onset cough, shortness of breath or difficulty breathing?  o New onset sore throat, myalgia (muscle aches and pains), headache, loss of taste/smell or diarrhea? ? Have you had a potential exposure to COVID-19 within the past 14 days by:  o Close contact with a confirmed case? o Close contact with a healthcare worker,  or essential infrastructure worker (grocery store, [employer], gas station, public utilities or transportation)? o Do you reside in a congregate setting such as; skilled nursing facility, adult home, correctional facility, homeless shelter or other institutional setting?  o Have you had recent travel to a known COVID-19 hotspot? Indicate if the patient has a positive screen by answering yes to one or more of the above questions. Patients who test positive or screen positive prior to surgery or on the day of surgery should be evaluated in conjunction with the surgeon/proceduralist/anesthesiologist to determine the urgency of the procedure.      No to all above

## 2021-02-14 ENCOUNTER — PREP FOR PROCEDURE (OUTPATIENT)
Dept: OPHTHALMOLOGY | Age: 76
End: 2021-02-14

## 2021-02-14 RX ORDER — CIPROFLOXACIN HYDROCHLORIDE 3.5 MG/ML
1 SOLUTION/ DROPS TOPICAL SEE ADMIN INSTRUCTIONS
Status: CANCELLED | OUTPATIENT
Start: 2021-02-14

## 2021-02-14 RX ORDER — PHENYLEPHRINE HYDROCHLORIDE 25 MG/ML
1 SOLUTION/ DROPS OPHTHALMIC SEE ADMIN INSTRUCTIONS
Status: CANCELLED | OUTPATIENT
Start: 2021-02-14

## 2021-02-14 RX ORDER — TETRACAINE HYDROCHLORIDE 5 MG/ML
1 SOLUTION OPHTHALMIC SEE ADMIN INSTRUCTIONS
Status: CANCELLED | OUTPATIENT
Start: 2021-02-14

## 2021-02-14 RX ORDER — CYCLOPENTOLATE HYDROCHLORIDE 20 MG/ML
1 SOLUTION/ DROPS OPHTHALMIC SEE ADMIN INSTRUCTIONS
Status: CANCELLED | OUTPATIENT
Start: 2021-02-14

## 2021-02-14 RX ORDER — SODIUM CHLORIDE 0.9 % (FLUSH) 0.9 %
10 SYRINGE (ML) INJECTION PRN
Status: CANCELLED | OUTPATIENT
Start: 2021-02-14

## 2021-02-14 RX ORDER — SODIUM CHLORIDE 0.9 % (FLUSH) 0.9 %
10 SYRINGE (ML) INJECTION EVERY 12 HOURS SCHEDULED
Status: CANCELLED | OUTPATIENT
Start: 2021-02-14

## 2021-02-15 ENCOUNTER — ANESTHESIA EVENT (OUTPATIENT)
Dept: SURGERY | Age: 76
End: 2021-02-15
Payer: MEDICARE

## 2021-02-16 ENCOUNTER — ANESTHESIA (OUTPATIENT)
Dept: SURGERY | Age: 76
End: 2021-02-16
Payer: MEDICARE

## 2021-03-01 RX ORDER — SODIUM CHLORIDE 0.9 % (FLUSH) 0.9 %
10 SYRINGE (ML) INJECTION PRN
Status: CANCELLED | OUTPATIENT
Start: 2021-03-01

## 2021-03-01 RX ORDER — TETRACAINE HYDROCHLORIDE 5 MG/ML
1 SOLUTION OPHTHALMIC SEE ADMIN INSTRUCTIONS
Status: CANCELLED | OUTPATIENT
Start: 2021-03-01

## 2021-03-01 RX ORDER — SODIUM CHLORIDE 0.9 % (FLUSH) 0.9 %
10 SYRINGE (ML) INJECTION EVERY 12 HOURS SCHEDULED
Status: CANCELLED | OUTPATIENT
Start: 2021-03-01

## 2021-03-01 RX ORDER — PHENYLEPHRINE HYDROCHLORIDE 25 MG/ML
1 SOLUTION/ DROPS OPHTHALMIC SEE ADMIN INSTRUCTIONS
Status: CANCELLED | OUTPATIENT
Start: 2021-03-01

## 2021-03-01 RX ORDER — CIPROFLOXACIN HYDROCHLORIDE 3.5 MG/ML
1 SOLUTION/ DROPS TOPICAL SEE ADMIN INSTRUCTIONS
Status: CANCELLED | OUTPATIENT
Start: 2021-03-01

## 2021-03-01 RX ORDER — TROPICAMIDE 10 MG/ML
1 SOLUTION/ DROPS OPHTHALMIC SEE ADMIN INSTRUCTIONS
Status: CANCELLED | OUTPATIENT
Start: 2021-03-01

## 2021-03-01 NOTE — PROGRESS NOTES
[address]        Pre-Op Phone Call:     Patient Name: Marc Rollins     Telephone Information:   Mobile [phone]     Home phone:  [phone]    Surgery Time:   12:10 PM     Arrival Time:  0698     Left extended Message:  NA     Message left with:     Recent change in health status:  No     Advised of transportation/ policy:  Yes     NPO policy reviewed:  Yes pt     Advised to take morning heart/blood pressure medications with sips of water morning of surgery? Yes     Instructed to bring eye drops, photo identification, and insurance card day of surgery? Yes     Advised to wear short sleeved button down shirt (no T-shirt underneath):  Yes     Advised not to wear jewelry, hairpins, or pantyhose day of surgery? Yes     Advised not to wear make-up and to wash face day of surgery?   Yes    Remarks:        Electronically signed by:  Xiang Love RN at 3/1/2021 12:47 PM

## 2021-03-02 ENCOUNTER — HOSPITAL ENCOUNTER (OUTPATIENT)
Age: 76
Setting detail: OUTPATIENT SURGERY
Discharge: HOME | End: 2021-03-02
Attending: OPHTHALMOLOGY | Admitting: OPHTHALMOLOGY
Payer: MEDICARE

## 2021-03-02 VITALS
RESPIRATION RATE: 20 BRPM | HEART RATE: 86 BPM | SYSTOLIC BLOOD PRESSURE: 133 MMHG | OXYGEN SATURATION: 98 % | HEIGHT: 68 IN | WEIGHT: 185 LBS | TEMPERATURE: 97 F | BODY MASS INDEX: 28.04 KG/M2 | DIASTOLIC BLOOD PRESSURE: 70 MMHG

## 2021-03-02 VITALS
OXYGEN SATURATION: 100 % | RESPIRATION RATE: 20 BRPM | SYSTOLIC BLOOD PRESSURE: 122 MMHG | DIASTOLIC BLOOD PRESSURE: 64 MMHG

## 2021-03-02 LAB
GLUCOSE BLD-MCNC: 78 MG/DL (ref 70–99)
GLUCOSE BLD-MCNC: 96 MG/DL (ref 70–99)
PERFORMED ON: NORMAL
PERFORMED ON: NORMAL

## 2021-03-02 PROCEDURE — 7100000011 HC PHASE II RECOVERY - ADDTL 15 MIN: Performed by: OPHTHALMOLOGY

## 2021-03-02 PROCEDURE — 2720000010 HC SURG SUPPLY STERILE: Performed by: OPHTHALMOLOGY

## 2021-03-02 PROCEDURE — 6360000002 HC RX W HCPCS: Performed by: ANESTHESIOLOGY

## 2021-03-02 PROCEDURE — 2580000003 HC RX 258: Performed by: ANESTHESIOLOGY

## 2021-03-02 PROCEDURE — 2500000003 HC RX 250 WO HCPCS: Performed by: OPHTHALMOLOGY

## 2021-03-02 PROCEDURE — 6370000000 HC RX 637 (ALT 250 FOR IP): Performed by: OPHTHALMOLOGY

## 2021-03-02 PROCEDURE — 7100000010 HC PHASE II RECOVERY - FIRST 15 MIN: Performed by: OPHTHALMOLOGY

## 2021-03-02 PROCEDURE — V2632 POST CHMBR INTRAOCULAR LENS: HCPCS | Performed by: OPHTHALMOLOGY

## 2021-03-02 PROCEDURE — 3600000014 HC SURGERY LEVEL 4 ADDTL 15MIN: Performed by: OPHTHALMOLOGY

## 2021-03-02 PROCEDURE — 6360000002 HC RX W HCPCS: Performed by: NURSE ANESTHETIST, CERTIFIED REGISTERED

## 2021-03-02 PROCEDURE — 3700000001 HC ADD 15 MINUTES (ANESTHESIA): Performed by: OPHTHALMOLOGY

## 2021-03-02 PROCEDURE — 3700000000 HC ANESTHESIA ATTENDED CARE: Performed by: OPHTHALMOLOGY

## 2021-03-02 PROCEDURE — 2500000003 HC RX 250 WO HCPCS: Performed by: NURSE ANESTHETIST, CERTIFIED REGISTERED

## 2021-03-02 PROCEDURE — 3600000004 HC SURGERY LEVEL 4 BASE: Performed by: OPHTHALMOLOGY

## 2021-03-02 PROCEDURE — 2709999900 HC NON-CHARGEABLE SUPPLY: Performed by: OPHTHALMOLOGY

## 2021-03-02 PROCEDURE — 6360000002 HC RX W HCPCS: Performed by: OPHTHALMOLOGY

## 2021-03-02 DEVICE — LENS INTRAOCULAR 1 PC 22.5+ DIOPT 6X12.5 MM POST CHMBR FLD: Type: IMPLANTABLE DEVICE | Site: EYE | Status: FUNCTIONAL

## 2021-03-02 RX ORDER — CYCLOPENTOLATE HYDROCHLORIDE 20 MG/ML
1 SOLUTION/ DROPS OPHTHALMIC SEE ADMIN INSTRUCTIONS
Status: DISCONTINUED | OUTPATIENT
Start: 2021-03-02 | End: 2021-03-02 | Stop reason: HOSPADM

## 2021-03-02 RX ORDER — CIPROFLOXACIN HYDROCHLORIDE 3.5 MG/ML
1 SOLUTION/ DROPS TOPICAL SEE ADMIN INSTRUCTIONS
Status: DISCONTINUED | OUTPATIENT
Start: 2021-03-02 | End: 2021-03-02 | Stop reason: SDUPTHER

## 2021-03-02 RX ORDER — ONDANSETRON 2 MG/ML
4 INJECTION INTRAMUSCULAR; INTRAVENOUS
Status: DISCONTINUED | OUTPATIENT
Start: 2021-03-02 | End: 2021-03-02 | Stop reason: HOSPADM

## 2021-03-02 RX ORDER — TETRACAINE HYDROCHLORIDE 5 MG/ML
1 SOLUTION OPHTHALMIC SEE ADMIN INSTRUCTIONS
Status: DISCONTINUED | OUTPATIENT
Start: 2021-03-02 | End: 2021-03-02 | Stop reason: HOSPADM

## 2021-03-02 RX ORDER — TROPICAMIDE 10 MG/ML
1 SOLUTION/ DROPS OPHTHALMIC SEE ADMIN INSTRUCTIONS
Status: COMPLETED | OUTPATIENT
Start: 2021-03-02 | End: 2021-03-02

## 2021-03-02 RX ORDER — PHENYLEPHRINE HCL 2.5 %
1 DROPS OPHTHALMIC (EYE) SEE ADMIN INSTRUCTIONS
Status: DISCONTINUED | OUTPATIENT
Start: 2021-03-02 | End: 2021-03-02 | Stop reason: SDUPTHER

## 2021-03-02 RX ORDER — SODIUM CHLORIDE 0.9 % (FLUSH) 0.9 %
10 SYRINGE (ML) INJECTION PRN
Status: DISCONTINUED | OUTPATIENT
Start: 2021-03-02 | End: 2021-03-02 | Stop reason: HOSPADM

## 2021-03-02 RX ORDER — BRIMONIDINE TARTRATE 2 MG/ML
SOLUTION/ DROPS OPHTHALMIC
Status: COMPLETED | OUTPATIENT
Start: 2021-03-02 | End: 2021-03-02

## 2021-03-02 RX ORDER — TETRACAINE HYDROCHLORIDE 5 MG/ML
SOLUTION OPHTHALMIC
Status: COMPLETED | OUTPATIENT
Start: 2021-03-02 | End: 2021-03-02

## 2021-03-02 RX ORDER — LIDOCAINE HYDROCHLORIDE 20 MG/ML
INJECTION, SOLUTION EPIDURAL; INFILTRATION; INTRACAUDAL; PERINEURAL PRN
Status: DISCONTINUED | OUTPATIENT
Start: 2021-03-02 | End: 2021-03-02 | Stop reason: SDUPTHER

## 2021-03-02 RX ORDER — PHENYLEPHRINE HCL 2.5 %
1 DROPS OPHTHALMIC (EYE) SEE ADMIN INSTRUCTIONS
Status: COMPLETED | OUTPATIENT
Start: 2021-03-02 | End: 2021-03-02

## 2021-03-02 RX ORDER — SODIUM CHLORIDE 0.9 % (FLUSH) 0.9 %
10 SYRINGE (ML) INJECTION EVERY 12 HOURS SCHEDULED
Status: DISCONTINUED | OUTPATIENT
Start: 2021-03-02 | End: 2021-03-02 | Stop reason: HOSPADM

## 2021-03-02 RX ORDER — SODIUM CHLORIDE 0.9 % (FLUSH) 0.9 %
10 SYRINGE (ML) INJECTION EVERY 12 HOURS SCHEDULED
Status: DISCONTINUED | OUTPATIENT
Start: 2021-03-02 | End: 2021-03-02 | Stop reason: SDUPTHER

## 2021-03-02 RX ORDER — CIPROFLOXACIN HYDROCHLORIDE 3.5 MG/ML
1 SOLUTION/ DROPS TOPICAL SEE ADMIN INSTRUCTIONS
Status: COMPLETED | OUTPATIENT
Start: 2021-03-02 | End: 2021-03-02

## 2021-03-02 RX ORDER — OFLOXACIN 3 MG/ML
SOLUTION/ DROPS OPHTHALMIC
Status: COMPLETED | OUTPATIENT
Start: 2021-03-02 | End: 2021-03-02

## 2021-03-02 RX ORDER — TETRACAINE HYDROCHLORIDE 5 MG/ML
1 SOLUTION OPHTHALMIC SEE ADMIN INSTRUCTIONS
Status: DISCONTINUED | OUTPATIENT
Start: 2021-03-02 | End: 2021-03-02 | Stop reason: SDUPTHER

## 2021-03-02 RX ORDER — MIDAZOLAM HYDROCHLORIDE 1 MG/ML
INJECTION INTRAMUSCULAR; INTRAVENOUS PRN
Status: DISCONTINUED | OUTPATIENT
Start: 2021-03-02 | End: 2021-03-02 | Stop reason: SDUPTHER

## 2021-03-02 RX ORDER — FENTANYL CITRATE 50 UG/ML
INJECTION, SOLUTION INTRAMUSCULAR; INTRAVENOUS PRN
Status: DISCONTINUED | OUTPATIENT
Start: 2021-03-02 | End: 2021-03-02 | Stop reason: SDUPTHER

## 2021-03-02 RX ORDER — MIDAZOLAM HYDROCHLORIDE 1 MG/ML
1 INJECTION INTRAMUSCULAR; INTRAVENOUS ONCE
Status: COMPLETED | OUTPATIENT
Start: 2021-03-02 | End: 2021-03-02

## 2021-03-02 RX ORDER — SODIUM CHLORIDE 0.9 % (FLUSH) 0.9 %
10 SYRINGE (ML) INJECTION PRN
Status: DISCONTINUED | OUTPATIENT
Start: 2021-03-02 | End: 2021-03-02 | Stop reason: SDUPTHER

## 2021-03-02 RX ORDER — SODIUM CHLORIDE 9 MG/ML
INJECTION, SOLUTION INTRAVENOUS CONTINUOUS
Status: DISCONTINUED | OUTPATIENT
Start: 2021-03-02 | End: 2021-03-02 | Stop reason: HOSPADM

## 2021-03-02 RX ADMIN — MIDAZOLAM 1 MG: 1 INJECTION INTRAMUSCULAR; INTRAVENOUS at 11:24

## 2021-03-02 RX ADMIN — PHENYLEPHRINE HYDROCHLORIDE 1 DROP: 2.5 SOLUTION/ DROPS OPHTHALMIC at 11:20

## 2021-03-02 RX ADMIN — LIDOCAINE HYDROCHLORIDE 50 MG: 20 INJECTION, SOLUTION EPIDURAL; INFILTRATION; INTRACAUDAL; PERINEURAL at 13:01

## 2021-03-02 RX ADMIN — MIDAZOLAM 2 MG: 1 INJECTION INTRAMUSCULAR; INTRAVENOUS at 13:01

## 2021-03-02 RX ADMIN — FENTANYL CITRATE 50 MCG: 50 INJECTION INTRAMUSCULAR; INTRAVENOUS at 13:16

## 2021-03-02 RX ADMIN — TROPICAMIDE 1 DROP: 10 SOLUTION/ DROPS OPHTHALMIC at 11:10

## 2021-03-02 RX ADMIN — SODIUM CHLORIDE: 9 INJECTION, SOLUTION INTRAVENOUS at 11:13

## 2021-03-02 RX ADMIN — PHENYLEPHRINE HYDROCHLORIDE 1 DROP: 2.5 SOLUTION/ DROPS OPHTHALMIC at 11:15

## 2021-03-02 RX ADMIN — TETRACAINE HYDROCHLORIDE 1 DROP: 5 SOLUTION OPHTHALMIC at 11:10

## 2021-03-02 RX ADMIN — TROPICAMIDE 1 DROP: 10 SOLUTION/ DROPS OPHTHALMIC at 11:20

## 2021-03-02 RX ADMIN — CIPROFLOXACIN 1 DROP: 3 SOLUTION OPHTHALMIC at 11:10

## 2021-03-02 RX ADMIN — LIDOCAINE HYDROCHLORIDE 50 MG: 20 INJECTION, SOLUTION EPIDURAL; INFILTRATION; INTRACAUDAL; PERINEURAL at 13:06

## 2021-03-02 RX ADMIN — FENTANYL CITRATE 50 MCG: 50 INJECTION INTRAMUSCULAR; INTRAVENOUS at 13:01

## 2021-03-02 RX ADMIN — TROPICAMIDE 1 DROP: 10 SOLUTION/ DROPS OPHTHALMIC at 11:15

## 2021-03-02 RX ADMIN — PHENYLEPHRINE HYDROCHLORIDE 1 DROP: 2.5 SOLUTION/ DROPS OPHTHALMIC at 11:10

## 2021-03-02 RX ADMIN — SODIUM CHLORIDE: 9 INJECTION, SOLUTION INTRAVENOUS at 12:48

## 2021-03-02 RX ADMIN — CIPROFLOXACIN 1 DROP: 3 SOLUTION OPHTHALMIC at 11:15

## 2021-03-02 ASSESSMENT — PAIN - FUNCTIONAL ASSESSMENT: PAIN_FUNCTIONAL_ASSESSMENT: 0-10

## 2021-03-02 ASSESSMENT — LIFESTYLE VARIABLES: SMOKING_STATUS: 1

## 2021-03-02 NOTE — H&P
Date of Surgery Update:  Ildefonso Botello was seen, history and physical examination reviewed, and patient examined by me today. There have been no significant clinical changes since the completion of the previous history and physical. The surgical site was confirmed by the patient and me. The risk, benefits, and alternatives of the proposed procedure have been explained to the patient (or appropriate guardian) and understanding verbalized. All questions answered. Patient wishes to proceed.     Electronically signed by: Verónica Sweeney MD,3/2/2021,12:16 PM

## 2021-03-02 NOTE — ANESTHESIA PRE PROCEDURE
[facility] Department of Anesthesiology  Pre-Anesthesia Evaluation/Consultation       Name:  Peggy Patino  : 1945  Age:  70 y.o. MRN:  [id number]  Date: 3/2/2021           Surgeon: Surgeon(s):  Sylvie Burkett MD    Procedure: Procedure(s):  PHACOEMULSIFICATION WITH INTRAOCULAR LENS IMPLANT - RIGHT     Allergies   Allergen Reactions    Codeine     Sulfa Antibiotics      Patient Active Problem List   Diagnosis    Depressive disorder, not elsewhere classified    Essential hypertension, benign    Type II or unspecified type diabetes mellitus without mention of complication, not stated as uncontrolled    Hyperlipidemia    Reflux    Weight loss    Osteoporosis    Benign skin lesion of neck    Stroke, acute, embolic ([address]    Accidental medication overdose, initial encounter    Acute metabolic encephalopathy    GI bleed    Suicide attempt ([address]    Acute adjustment disorder with disturbance of conduct    Sepsis ([address]    Left lower lobe pneumonia    Hyponatremia     Past Medical History:   Diagnosis Date    Anxiety     Depression     has seen dr Renu Edmonds in the past who placed her on the meds    Diabetes mellitus ([address]     Headache(784.0)     Hyperlipidemia     Hypertension     Insomnia      Past Surgical History:   Procedure Laterality Date    HYSTERECTOMY       Social History     Tobacco Use    Smoking status: Current Every Day Smoker     Packs/day: 0.75     Types: Cigarettes    Smokeless tobacco: Never Used   Substance Use Topics    Alcohol use: No    Drug use: No     Medications  No current facility-administered medications on file prior to encounter.       Current Outpatient Medications on File Prior to Encounter   Medication Sig Dispense Refill    aspirin 81 MG EC tablet Take 81 mg by mouth daily      albuterol sulfate HFA (VENTOLIN HFA) 108 (90 Base) MCG/ACT inhaler Inhale 2 puffs into the lungs 4 times daily      VITAMIN D, CHOLECALCIFEROL, PO Take 10,000 Units by mouth once a week      glipiZIDE (GLUCOTROL XL) 10 MG extended release tablet Take 10 mg by mouth daily      venlafaxine (EFFEXOR XR) 37.5 MG extended release capsule Take 37.5 mg by mouth daily       losartan (COZAAR) 25 MG tablet Take 25 mg by mouth daily      clonazepam (KLONOPIN) 1 MG tablet Take 1 tablet by mouth 2 times daily as needed for Anxiety.  (Patient taking differently: Take 1 mg by mouth daily. ) 60 tablet 0    Polysaccharide Iron Complex (PROFE) 391.3 (180 Fe) MG CAPS Take 1 capsule by mouth 2 times daily       Current Facility-Administered Medications   Medication Dose Route Frequency Provider Last Rate Last Admin    0.9 % sodium chloride infusion   Intravenous Continuous Svitlana Frey MD 75 mL/hr at 21 1113 New Bag at 21 1113    sodium chloride flush 0.9 % injection 10 mL  10 mL Intravenous 2 times per day Svitlana Frey MD        sodium chloride flush 0.9 % injection 10 mL  10 mL Intravenous PRN Svitlana Frey MD        tetracaine (TETRAVISC) 0.5 % ophthalmic solution 1 drop  1 drop Right Eye See Admin Instructions Geneva Sultana MD   1 drop at 21 1110    cyclopentolate (CYCLOGYL) 2 % ophthalmic solution 1 drop  1 drop Right Eye See Admin Instructions Geneva Sultana MD         Vital Signs (Current)   Vitals:    21 1228 21 1104   BP:  (!) 105/56   Pulse:  83   Resp:  19   Temp:  97.7 °F (36.5 °C)   TempSrc:  Temporal   SpO2:  98%   Weight: 185 lb (83.9 kg) 185 lb (83.9 kg)   Height: 5' 8\" (1.727 m) 5' 8\" (1.727 m)                                          BP Readings from Last 3 Encounters:   21 (!) 105/56   20 (!) 93/54   18 109/67     Vital Signs Statistics (for past 48 hrs)     Temp  Av.7 °F (36.5 °C)  Min: 97.7 °F (36.5 °C)   Min taken time: 21 1104  Max: 97.7 °F (36.5 °C)   Max taken time: 21 1104  Pulse  Av  Min: 80   Min taken time: 21 110  Max: 80   Max taken time: 21 1104  Resp  Av  Min: 23   Min taken time: 21 1104  Max: 23   Max taken time: 21 1104  BP  Min: 105/56   Min taken time: 21 1104  Max: 105/56   Max taken time: 21 1104  SpO2  Av %  Min: 98 %   Min taken time: 21 1104  Max: 98 %   Max taken time: 21 1104  BP Readings from Last 3 Encounters:   21 (!) 105/56   20 (!) 93/54   18 109/67       BMI  Body mass index is 28.13 kg/m². Estimated body mass index is 28.13 kg/m² as calculated from the following:    Height as of this encounter: 5' 8\" (1.727 m). Weight as of this encounter: 185 lb (83.9 kg). CBC   Lab Results   Component Value Date    WBC 13.7 2020    RBC 4.58 2020    HGB 13.8 2020    HCT 41.5 2020    MCV 90.6 2020    RDW 14.2 2020     2020     CMP    Lab Results   Component Value Date     2020    K 3.8 2020    K 5.0 2020    CL 93 2020    CO2 22 2020    BUN 16 2020    CREATININE 0.6 2020    GFRAA >60 2020    GFRAA >60 2012    AGRATIO 1.0 2020    LABGLOM >60 2020    GLUCOSE 119 2020    PROT 6.6 2020    PROT 7.6 2012    CALCIUM 8.4 2020    BILITOT 0.9 2020    ALKPHOS 43 2020    AST 80 2020    ALT 29 2020     BMP    Lab Results   Component Value Date     2020    K 3.8 2020    K 5.0 2020    CL 93 2020    CO2 22 2020    BUN 16 2020    CREATININE 0.6 2020    CALCIUM 8.4 2020    GFRAA >60 2020    GFRAA >60 2012    LABGLOM >60 2020    GLUCOSE 119 2020     POCGlucose  No results for input(s): GLUCOSE in the last 72 hours.    Coags    Lab Results   Component Value Date    PROTIME 12.1 2018    INR 1.06 2018    APTT 24.7      HCG (If Applicable) No results found for: PREGTESTUR, PREGSERUM, HCG, HCGQUANT   ABGs No results found for: PHART, PO2ART, QWW4XSS, WOF4GOJ, BEART, Z1GRDJNE   Type & Screen (If Applicable)  No results found for: LABABO, LABRH                         BMI: Wt Readings from Last 3 Encounters:       NPO Status:   Date of last liquid consumption: 03/01/21   Time of last liquid consumption: 2200   Date of last solid food consumption: 03/01/21      Time of last solid consumption: 2200       Anesthesia Evaluation  Patient summary reviewed no history of anesthetic complications:   Airway: Mallampati: II        Dental:          Pulmonary:   (+) current smoker                           Cardiovascular:    (+) hypertension:, hyperlipidemia    (-) valvular problems/murmurs                Neuro/Psych:   (+) CVA:, headaches:, psychiatric history:depression/anxiety  (severe anxiety--unsure will proceed with surgery)            GI/Hepatic/Renal:   (+) PUD,      (-) no renal disease and bowel prep       Endo/Other:    (+) DiabetesType II DM, , .    (-) hypothyroidism, hyperthyroidism               Abdominal:           Vascular:     - DVT and PE. Anesthesia Plan      MAC     ASA 3     (Preop BZP to try to manage Ms. Arteaga's anxiety. She and daughter are willing to try early anxiolytic, but have expressed that she may not want to proceed with surgery today. )  Induction: intravenous. MIPS: Prophylactic antiemetics administered. Anesthetic plan and risks discussed with patient and child/children. Plan discussed with CRNA. Attending anesthesiologist reviewed and agrees with Pre Eval content              This pre-anesthesia assessment may be used as a history and physical.    DOS STAFF ADDENDUM:    Pt seen and examined, chart reviewed (including anesthesia, drug and allergy history). No interval changes to history and physical examination. Anesthetic plan, risks, benefits, alternatives, and personnel involved discussed with patient.   Patient verbalized an understanding and agrees to proceed.       Barrera Manriquez MD  March 2, 2021  12:08 PM

## 2021-03-02 NOTE — ANESTHESIA POSTPROCEDURE EVALUATION
Department of Anesthesiology  Postprocedure Note    Patient: Rossana Colmenares  MRN: [id number]  YOB: 1945  Date of evaluation: 3/2/2021  Time:  1:43 PM     Procedure Summary     Date: 03/02/21 Room / Location: [facility]    Anesthesia Start: 1132 Anesthesia Stop: 4975    Procedure: PHACOEMULSIFICATION WITH INTRAOCULAR LENS IMPLANT - RIGHT (Right Eye) Diagnosis:       Nuclear sclerosis, right      (Nuclear sclerosis, right)    Surgeons: Mel Melissa MD Responsible Provider: Cristian Graff MD    Anesthesia Type: MAC ASA Status: 3          Anesthesia Type: MAC    Callie Phase I: Callie Score: 10    Callie Phase II: Callie Score: 10    Last vitals: Reviewed and per EMR flowsheets.        Anesthesia Post Evaluation    Patient location during evaluation: bedside  Patient participation: complete - patient participated  Level of consciousness: awake  Pain score: 0  Airway patency: patent  Nausea & Vomiting: no nausea and no vomiting  Complications: no  Cardiovascular status: blood pressure returned to baseline  Respiratory status: acceptable  Hydration status: euvolemic

## 2021-03-02 NOTE — OP NOTE
[facility]  [address]  [phone]      3/2/2021    Patient name: Terri Rebolledo  YOB: 1945  MRN: [id number]         OPERATIVE REPORT      DATE OF OPERATION: 3/2/2021     SURGEON: Yesi Bates  ASSISTANT(S): None            PREOPERATIVE DIAGNOSIS:  Age-related Nuclear Sclerotic Cataract -RIGHT EYE  POSTOPERATIVE DIAGNOSIS:  same    OPERATION PERFORMED: Procedure(s):  PHACOEMULSIFICATION WITH INTRAOCULAR LENS IMPLANT - RIGHT   ANESTHESIA:   Monitor Anesthesia Care  ESTIMATED BLOOD LOSS:  None  COMPLICATIONS:  None  IOL USED:  Bausch and Lomb MX60E +22.5    INDICATIONS FOR PROCEDURE:    Best corrected visual acuity of slit lamp confirmation cataract:  20/80  Patient's evaluation of visual status of operative eye:  Decreased vision with reading, watching TV and night vision times months. DESCRIPTION OF PROCEDURE: Merlin Manzano is a 70 y.o. female. After topical  anesthesia and pupillary dilation were obtained, the patient was prepped and draped in the usual sterile fashion for cataract implant surgery. A wire lid speculum was placed and the operating microscope was moved into position over the eye. A paracentesis clear corneal incision was made with a super blade. Approximately 0.5 ml Epi-Shugarcaine was injected into the anterior chamber. This was followed by Viscoat to fill the anterior chamber. A temporal clear corneal incision was made with a 2.75 mm keratome and a bent needle and Utrata forceps were used to perform an anterior capsulorhexis. Hydrodissection was performed on the cataract. Phacoemulsification of the nucleus was performed. Cortex was removed using an automated irrigation/aspiration hand piece and vacuuming of the posterior capsule was also carried out with the same hand piece on minimal settings. Provisc was used to re-inflate the capsular bag and a foldable intraocular lens was placed into the capsular bag. Provisc was removed from posterior to the implant and anterior to the implant by using a Nikos irrigation/aspiration hand piece. 0.3 ml Cefuroxime was placed in the anterior chamber. The corneal incision was checked and the incision was watertight without suture. The wire lid speculum was removed and the patient received topical Ofloxacin, and Alphagan P drops. At the conclusion of the procedure, the cornea was clear and the anterior chamber was deep, the pupil was round, and the implant was in good position. The patient tolerated the procedure well and was returned to the recovery room in good condition to be seen for follow-up the next day. ATTENDING ATTESTATION: I was present and scrubbed for the entire procedure.       ELECTRONICALLY SIGNED BY: Melanie Prado, 3/2/2021 1:28 PM

## 2021-03-11 NOTE — PROGRESS NOTES
[facility] time___1120_________        Surgery time___1250_________    Take the following medications with a sip of water: Losartan     Do not eat or drink anything after 12:00 midnight prior to your surgery. Clear liquids until 0800  This includes water chewing gum, mints and ice chips. You may brush your teeth and gargle the morning of your surgery, but do not swallow the water     Please see your family doctor/pediatrician for a history and physical and/or concerning medications. H&P 2/8 Dr. Gaby Sheth will check to make sure Dr. Kriss Mcnair will update H&P. Bring any test results/reports from your physicians office. If you are under the care of a heart doctor or specialist doctor, please be aware that you may be asked to them for clearance    You may be asked to stop blood thinners such as Coumadin, Plavix, Fragmin, Lovenox, etc., or any anti-inflammatories such as:  Aspirin, Ibuprofen, Advil, Naproxen prior to your surgery. We also ask that you stop any OTC medications such as fish oil, vitamin E, glucosamine, garlic, Multivitamins, COQ 10, etc.    We ask that you do not smoke 24 hours prior to surgery  We ask that you do not  drink any alcoholic beverages 24 hours prior to surgery     You must make arrangements for a responsible adult to take you home after your surgery. For your safety you will not be allowed to leave alone or drive yourself home. Your surgery will be cancelled if you do not have a ride home. Also for your safety, it is strongly suggested that someone stay with you the first 24 hours after your surgery. A parent or legal guardian must accompany a child scheduled for surgery and plan to stay at the hospital until the child is discharged. Please do not bring other children with you. For your comfort, please wear simple loose fitting clothing to the hospital.  Please do not bring valuables.  Wear loose fitting shirt or button down shirt. Bring eye drops or ointment day of surgery. Do not wear any make-up or nail polish on your fingers or toes      For your safety, please do not wear any jewelry or body piercing's on the day of surgery. All jewelry must be removed. If you have dentures, they will be removed before going to operating room. For your convenience, we will provide you with a container. If you wear contact lenses or glasses, they will be removed, please bring a case for them. If you have a living will and a durable power of  for healthcare, please bring in a copy. As part of our patient safety program to minimize surgical site infections, we ask you to do the following:    · Please notify your surgeon if you develop any illness between         now and the  day of your surgery. · This includes a cough, cold, fever, sore throat, nausea,         or vomiting, and diarrhea, etc.  ·  Please notify your surgeon if you experience dizziness, shortness         of breath or blurred vision between now and the time of your surgery. Do not shave your operative site 96 hours prior to surgery. For face and neck surgery, men may use an electric razor 48 hours   prior to surgery. You may shower the night before surgery or the morning of   your surgery with an antibacterial soap. You will need to bring a photo ID and insurance card    [facility] has an onsite pharmacy, would you like to utilize our pharmacy     If you will be staying overnight and use a C-pap machine, please bring   your C-pap to hospital     Our goal is to provide you with excellent care, therefore, visitors will be limited to two(2) in the room at a time so that we may focus on providing this care for you. Please contact pre-admission testing if you have any further questions.                  [facility] phone number:  [phone]  Please note these are generalized instructions for all surgical cases, you may be provided with more specific instructions according to your surgery.

## 2021-03-15 ENCOUNTER — ANESTHESIA EVENT (OUTPATIENT)
Dept: SURGERY | Age: 76
End: 2021-03-15
Payer: MEDICARE

## 2021-03-15 RX ORDER — SODIUM CHLORIDE 0.9 % (FLUSH) 0.9 %
10 SYRINGE (ML) INJECTION EVERY 12 HOURS SCHEDULED
Status: CANCELLED | OUTPATIENT
Start: 2021-03-15

## 2021-03-15 RX ORDER — SODIUM CHLORIDE 0.9 % (FLUSH) 0.9 %
10 SYRINGE (ML) INJECTION PRN
Status: CANCELLED | OUTPATIENT
Start: 2021-03-15

## 2021-03-15 RX ORDER — PHENYLEPHRINE HYDROCHLORIDE 25 MG/ML
1 SOLUTION/ DROPS OPHTHALMIC SEE ADMIN INSTRUCTIONS
Status: CANCELLED | OUTPATIENT
Start: 2021-03-15

## 2021-03-15 RX ORDER — TROPICAMIDE 10 MG/ML
1 SOLUTION/ DROPS OPHTHALMIC SEE ADMIN INSTRUCTIONS
Status: CANCELLED | OUTPATIENT
Start: 2021-03-15

## 2021-03-15 RX ORDER — TETRACAINE HYDROCHLORIDE 5 MG/ML
1 SOLUTION OPHTHALMIC SEE ADMIN INSTRUCTIONS
Status: CANCELLED | OUTPATIENT
Start: 2021-03-15

## 2021-03-15 RX ORDER — CIPROFLOXACIN HYDROCHLORIDE 3.5 MG/ML
1 SOLUTION/ DROPS TOPICAL SEE ADMIN INSTRUCTIONS
Status: CANCELLED | OUTPATIENT
Start: 2021-03-15

## 2021-03-15 NOTE — PROGRESS NOTES
[address]        Pre-Op Phone Call:     Patient Name: Merlin Manzano     No relevant phone numbers on file. Home phone:  [phone]    Surgery Time:   12:50 PM     Arrival Time:  1120      Left extended Message:  Yes     Message left with: vm     Recent change in health status:  Call md     Advised of transportation/ policy:  Yes     NPO policy reviewed:  Yes clear liquids until 0800 vm     Advised to take morning heart/blood pressure medications with sips of water morning of surgery? Yes     Instructed to bring eye drops, photo identification, and insurance card day of surgery? Yes     Advised to wear short sleeved button down shirt (no T-shirt underneath):  Yes     Advised not to wear jewelry, hairpins, or pantyhose day of surgery? Yes     Advised not to wear make-up and to wash face day of surgery?   Yes    Remarks:        Electronically signed by:  Steve Leyva RN at 3/15/2021 4:39 PM

## 2021-03-16 ENCOUNTER — HOSPITAL ENCOUNTER (OUTPATIENT)
Age: 76
Setting detail: OUTPATIENT SURGERY
Discharge: HOME | End: 2021-03-16
Attending: OPHTHALMOLOGY | Admitting: OPHTHALMOLOGY
Payer: MEDICARE

## 2021-03-16 ENCOUNTER — ANESTHESIA (OUTPATIENT)
Dept: SURGERY | Age: 76
End: 2021-03-16
Payer: MEDICARE

## 2021-03-16 VITALS
DIASTOLIC BLOOD PRESSURE: 54 MMHG | SYSTOLIC BLOOD PRESSURE: 95 MMHG | RESPIRATION RATE: 14 BRPM | OXYGEN SATURATION: 100 %

## 2021-03-16 VITALS
HEART RATE: 77 BPM | BODY MASS INDEX: 28.04 KG/M2 | WEIGHT: 185 LBS | TEMPERATURE: 97.2 F | OXYGEN SATURATION: 98 % | HEIGHT: 68 IN | DIASTOLIC BLOOD PRESSURE: 60 MMHG | SYSTOLIC BLOOD PRESSURE: 123 MMHG | RESPIRATION RATE: 19 BRPM

## 2021-03-16 LAB
GLUCOSE BLD-MCNC: 102 MG/DL (ref 70–99)
GLUCOSE BLD-MCNC: 83 MG/DL (ref 70–99)
PERFORMED ON: ABNORMAL
PERFORMED ON: NORMAL

## 2021-03-16 PROCEDURE — 6370000000 HC RX 637 (ALT 250 FOR IP): Performed by: OPHTHALMOLOGY

## 2021-03-16 PROCEDURE — V2632 POST CHMBR INTRAOCULAR LENS: HCPCS | Performed by: OPHTHALMOLOGY

## 2021-03-16 PROCEDURE — 3600000014 HC SURGERY LEVEL 4 ADDTL 15MIN: Performed by: OPHTHALMOLOGY

## 2021-03-16 PROCEDURE — 2500000003 HC RX 250 WO HCPCS: Performed by: OPHTHALMOLOGY

## 2021-03-16 PROCEDURE — 2580000003 HC RX 258: Performed by: ANESTHESIOLOGY

## 2021-03-16 PROCEDURE — 3700000000 HC ANESTHESIA ATTENDED CARE: Performed by: OPHTHALMOLOGY

## 2021-03-16 PROCEDURE — 3600000004 HC SURGERY LEVEL 4 BASE: Performed by: OPHTHALMOLOGY

## 2021-03-16 PROCEDURE — 3700000001 HC ADD 15 MINUTES (ANESTHESIA): Performed by: OPHTHALMOLOGY

## 2021-03-16 PROCEDURE — 7100000010 HC PHASE II RECOVERY - FIRST 15 MIN: Performed by: OPHTHALMOLOGY

## 2021-03-16 PROCEDURE — 6360000002 HC RX W HCPCS: Performed by: OPHTHALMOLOGY

## 2021-03-16 PROCEDURE — 6360000002 HC RX W HCPCS

## 2021-03-16 PROCEDURE — 7100000011 HC PHASE II RECOVERY - ADDTL 15 MIN: Performed by: OPHTHALMOLOGY

## 2021-03-16 PROCEDURE — 2720000010 HC SURG SUPPLY STERILE: Performed by: OPHTHALMOLOGY

## 2021-03-16 PROCEDURE — 2709999900 HC NON-CHARGEABLE SUPPLY: Performed by: OPHTHALMOLOGY

## 2021-03-16 DEVICE — LENS INTRAOCULAR BCNVX +22.5 DIOPT 6X12.5 MM ENVISTA: Type: IMPLANTABLE DEVICE | Site: EYE | Status: FUNCTIONAL

## 2021-03-16 RX ORDER — OFLOXACIN 3 MG/ML
SOLUTION/ DROPS OPHTHALMIC
Status: COMPLETED | OUTPATIENT
Start: 2021-03-16 | End: 2021-03-16

## 2021-03-16 RX ORDER — SODIUM CHLORIDE 9 MG/ML
INJECTION, SOLUTION INTRAVENOUS CONTINUOUS
Status: DISCONTINUED | OUTPATIENT
Start: 2021-03-16 | End: 2021-03-16 | Stop reason: HOSPADM

## 2021-03-16 RX ORDER — SODIUM CHLORIDE 0.9 % (FLUSH) 0.9 %
10 SYRINGE (ML) INJECTION EVERY 12 HOURS SCHEDULED
Status: DISCONTINUED | OUTPATIENT
Start: 2021-03-16 | End: 2021-03-16 | Stop reason: HOSPADM

## 2021-03-16 RX ORDER — FENTANYL CITRATE 50 UG/ML
INJECTION, SOLUTION INTRAMUSCULAR; INTRAVENOUS PRN
Status: DISCONTINUED | OUTPATIENT
Start: 2021-03-16 | End: 2021-03-16 | Stop reason: SDUPTHER

## 2021-03-16 RX ORDER — MIDAZOLAM HYDROCHLORIDE 1 MG/ML
INJECTION INTRAMUSCULAR; INTRAVENOUS PRN
Status: DISCONTINUED | OUTPATIENT
Start: 2021-03-16 | End: 2021-03-16 | Stop reason: SDUPTHER

## 2021-03-16 RX ORDER — TETRACAINE HYDROCHLORIDE 5 MG/ML
1 SOLUTION OPHTHALMIC SEE ADMIN INSTRUCTIONS
Status: DISCONTINUED | OUTPATIENT
Start: 2021-03-16 | End: 2021-03-16 | Stop reason: HOSPADM

## 2021-03-16 RX ORDER — ONDANSETRON 2 MG/ML
4 INJECTION INTRAMUSCULAR; INTRAVENOUS
Status: DISCONTINUED | OUTPATIENT
Start: 2021-03-16 | End: 2021-03-16 | Stop reason: HOSPADM

## 2021-03-16 RX ORDER — PHENYLEPHRINE HCL 2.5 %
1 DROPS OPHTHALMIC (EYE) SEE ADMIN INSTRUCTIONS
Status: COMPLETED | OUTPATIENT
Start: 2021-03-16 | End: 2021-03-16

## 2021-03-16 RX ORDER — SODIUM CHLORIDE 0.9 % (FLUSH) 0.9 %
10 SYRINGE (ML) INJECTION PRN
Status: DISCONTINUED | OUTPATIENT
Start: 2021-03-16 | End: 2021-03-16 | Stop reason: SDUPTHER

## 2021-03-16 RX ORDER — BRIMONIDINE TARTRATE 2 MG/ML
SOLUTION/ DROPS OPHTHALMIC
Status: COMPLETED | OUTPATIENT
Start: 2021-03-16 | End: 2021-03-16

## 2021-03-16 RX ORDER — CIPROFLOXACIN HYDROCHLORIDE 3.5 MG/ML
1 SOLUTION/ DROPS TOPICAL SEE ADMIN INSTRUCTIONS
Status: COMPLETED | OUTPATIENT
Start: 2021-03-16 | End: 2021-03-16

## 2021-03-16 RX ORDER — SODIUM CHLORIDE 0.9 % (FLUSH) 0.9 %
10 SYRINGE (ML) INJECTION EVERY 12 HOURS SCHEDULED
Status: DISCONTINUED | OUTPATIENT
Start: 2021-03-16 | End: 2021-03-16 | Stop reason: SDUPTHER

## 2021-03-16 RX ORDER — TROPICAMIDE 10 MG/ML
1 SOLUTION/ DROPS OPHTHALMIC SEE ADMIN INSTRUCTIONS
Status: COMPLETED | OUTPATIENT
Start: 2021-03-16 | End: 2021-03-16

## 2021-03-16 RX ORDER — SODIUM CHLORIDE 0.9 % (FLUSH) 0.9 %
10 SYRINGE (ML) INJECTION PRN
Status: DISCONTINUED | OUTPATIENT
Start: 2021-03-16 | End: 2021-03-16 | Stop reason: HOSPADM

## 2021-03-16 RX ORDER — TETRACAINE HYDROCHLORIDE 5 MG/ML
SOLUTION OPHTHALMIC
Status: COMPLETED | OUTPATIENT
Start: 2021-03-16 | End: 2021-03-16

## 2021-03-16 RX ADMIN — PHENYLEPHRINE HYDROCHLORIDE 1 DROP: 2.5 SOLUTION/ DROPS OPHTHALMIC at 12:00

## 2021-03-16 RX ADMIN — PHENYLEPHRINE HYDROCHLORIDE 1 DROP: 2.5 SOLUTION/ DROPS OPHTHALMIC at 11:50

## 2021-03-16 RX ADMIN — CIPROFLOXACIN 1 DROP: 3 SOLUTION OPHTHALMIC at 11:55

## 2021-03-16 RX ADMIN — FENTANYL CITRATE 25 MCG: 50 INJECTION INTRAMUSCULAR; INTRAVENOUS at 14:25

## 2021-03-16 RX ADMIN — CIPROFLOXACIN 1 DROP: 3 SOLUTION OPHTHALMIC at 11:50

## 2021-03-16 RX ADMIN — TROPICAMIDE 1 DROP: 10 SOLUTION/ DROPS OPHTHALMIC at 11:55

## 2021-03-16 RX ADMIN — SODIUM CHLORIDE: 9 INJECTION, SOLUTION INTRAVENOUS at 14:10

## 2021-03-16 RX ADMIN — TETRACAINE HYDROCHLORIDE 1 DROP: 5 SOLUTION OPHTHALMIC at 11:50

## 2021-03-16 RX ADMIN — TROPICAMIDE 1 DROP: 10 SOLUTION/ DROPS OPHTHALMIC at 12:00

## 2021-03-16 RX ADMIN — SODIUM CHLORIDE: 9 INJECTION, SOLUTION INTRAVENOUS at 12:03

## 2021-03-16 RX ADMIN — MIDAZOLAM 1 MG: 1 INJECTION INTRAMUSCULAR; INTRAVENOUS at 14:15

## 2021-03-16 RX ADMIN — MIDAZOLAM 1 MG: 1 INJECTION INTRAMUSCULAR; INTRAVENOUS at 14:12

## 2021-03-16 RX ADMIN — FENTANYL CITRATE 25 MCG: 50 INJECTION INTRAMUSCULAR; INTRAVENOUS at 14:18

## 2021-03-16 RX ADMIN — TROPICAMIDE 1 DROP: 10 SOLUTION/ DROPS OPHTHALMIC at 11:50

## 2021-03-16 RX ADMIN — PHENYLEPHRINE HYDROCHLORIDE 1 DROP: 2.5 SOLUTION/ DROPS OPHTHALMIC at 11:55

## 2021-03-16 ASSESSMENT — LIFESTYLE VARIABLES: SMOKING_STATUS: 1

## 2021-03-16 ASSESSMENT — PAIN - FUNCTIONAL ASSESSMENT: PAIN_FUNCTIONAL_ASSESSMENT: 0-10

## 2021-03-16 ASSESSMENT — PAIN SCALES - GENERAL: PAINLEVEL_OUTOF10: 0

## 2021-03-16 NOTE — OP NOTE
[facility]  [facility]  [address]  [phone]      3/16/2021    Patient name: Marc Rollins  YOB: 1945  MRN: [id number]         OPERATIVE REPORT      DATE OF OPERATION: 3/16/2021     SURGEON: Savi Estevez  ASSISTANT(S): None            PREOPERATIVE DIAGNOSIS:  Age-related Nuclear Sclerotic Cataract -left eye  POSTOPERATIVE DIAGNOSIS:  same    OPERATION PERFORMED: Procedure(s):  PHACOEMULSIFICATION WITH INTRAOCULAR LENS IMPLANT - LEFT EYE   ANESTHESIA:   Monitor Anesthesia Care  ESTIMATED BLOOD LOSS:  None  COMPLICATIONS:  None  IOL USED:  Bausch and Lomb MX60E +22.5    INDICATIONS FOR PROCEDURE:    Best corrected visual acuity of slit lamp confirmation cataract:  20/70  Patient's evaluation of visual status of operative eye:  Decreased vision with reading, TV and night vision times many months. DESCRIPTION OF PROCEDURE: Marc Rollins, is a 70 y.o. female. After topical  anesthesia and pupillary dilation were obtained, the patient was prepped and draped in the usual sterile fashion for cataract implant surgery. A wire lid speculum was placed and the operating microscope was moved into position over the eye. A paracentesis clear corneal incision was made with a super blade. Approximately 0.5 ml Epi-Shugarcaine was injected into the anterior chamber. This was followed by Viscoat to fill the anterior chamber. A temporal clear corneal incision was made with a 2.75 mm keratome and a bent needle and Utrata forceps were used to perform an anterior capsulorhexis. Hydrodissection was performed on the cataract. Phacoemulsification of the nucleus was performed. Cortex was removed using an automated irrigation/aspiration hand piece and vacuuming of the posterior capsule was also carried out with the same hand piece on minimal settings.  Provisc was used to re-inflate the capsular bag and a foldable intraocular lens was placed into the capsular bag. Provisc was removed from posterior to the implant and anterior to the implant by using a Nikos irrigation/aspiration hand piece. 0.3 ml Cefuroxime was placed in the anterior chamber. The corneal incision was checked and the incision was watertight without suture. The wire lid speculum was removed and the patient received topical Ofloxacin, and Alphagan P drops. At the conclusion of the procedure, the cornea was clear and the anterior chamber was deep, the pupil was round, and the implant was in good position. The patient tolerated the procedure well and was returned to the recovery room in good condition to be seen for follow-up the next day. ATTENDING ATTESTATION: I was present and scrubbed for the entire procedure.       ELECTRONICALLY SIGNED BY: Verónica Sweeney, 3/16/2021 2:39 PM

## 2021-03-16 NOTE — ANESTHESIA PRE PROCEDURE
[facility] Department of Anesthesiology  Pre-Anesthesia Evaluation/Consultation       Name:  Rima Hinkle  : 1945  Age:  70 y.o.                                            MRN:  [id number]  Date: 3/16/2021           Surgeon: Surgeon(s):  Petey Chester MD    Procedure: Procedure(s):  PHACOEMULSIFICATION WITH INTRAOCULAR LENS IMPLANT - LEFT EYE     Allergies   Allergen Reactions    Codeine     Sulfa Antibiotics      Patient Active Problem List   Diagnosis    Depressive disorder, not elsewhere classified    Essential hypertension, benign    Type II or unspecified type diabetes mellitus without mention of complication, not stated as uncontrolled    Hyperlipidemia    Reflux    Weight loss    Osteoporosis    Benign skin lesion of neck    Stroke, acute, embolic ([address]    Accidental medication overdose, initial encounter    Acute metabolic encephalopathy    GI bleed    Suicide attempt ([address]    Acute adjustment disorder with disturbance of conduct    Sepsis ([address]    Left lower lobe pneumonia    Hyponatremia     Past Medical History:   Diagnosis Date    Anxiety     Depression     has seen dr Bill Rodriguez in the past who placed her on the meds    Diabetes mellitus ([address]     Headache(784.0)     Hyperlipidemia     Hypertension     Insomnia      Past Surgical History:   Procedure Laterality Date    HYSTERECTOMY      INTRACAPSULAR CATARACT EXTRACTION Right 3/2/2021    PHACOEMULSIFICATION WITH INTRAOCULAR LENS IMPLANT - RIGHT performed by Petey Chester MD at [address] [place] [address]     Social History     Tobacco Use    Smoking status: Current Every Day Smoker     Packs/day: 0.75     Types: Cigarettes    Smokeless tobacco: Never Used   Substance Use Topics    Alcohol use: No    Drug use: No     Medications  Current Outpatient Medications on File Prior to Visit   Medication Sig Dispense Refill    aspirin 81 MG EC tablet Take 81 mg by mouth daily      albuterol sulfate HFA (VENTOLIN HFA) 108 (90 Base) MCG/ACT inhaler Inhale 2 puffs into the lungs 4 times daily      Polysaccharide Iron Complex (PROFE) 391.3 (180 Fe) MG CAPS Take 1 capsule by mouth 2 times daily      VITAMIN D, CHOLECALCIFEROL, PO Take 10,000 Units by mouth once a week      glipiZIDE (GLUCOTROL XL) 10 MG extended release tablet Take 10 mg by mouth daily      venlafaxine (EFFEXOR XR) 37.5 MG extended release capsule Take 37.5 mg by mouth daily       losartan (COZAAR) 25 MG tablet Take 25 mg by mouth daily      clonazepam (KLONOPIN) 1 MG tablet Take 1 tablet by mouth 2 times daily as needed for Anxiety. (Patient taking differently: Take 1 mg by mouth daily. ) 60 tablet 0     No current facility-administered medications on file prior to visit. No current outpatient medications on file. No current facility-administered medications for this visit. Vital Signs (Current)   There were no vitals filed for this visit. BP Readings from Last 3 Encounters:   03/02/21 133/70   03/02/21 122/64   08/05/20 (!) 93/54     Vital Signs Statistics (for past 48 hrs)     No data recorded  BP Readings from Last 3 Encounters:   03/02/21 133/70   03/02/21 122/64   08/05/20 (!) 93/54       BMI  There is no height or weight on file to calculate BMI. Estimated body mass index is 28.13 kg/m² as calculated from the following:    Height as of 3/11/21: 5' 8\" (1.727 m). Weight as of 3/11/21: 185 lb (83.9 kg).     CBC   Lab Results   Component Value Date    WBC 13.7 08/03/2020    RBC 4.58 08/03/2020    HGB 13.8 08/03/2020    HCT 41.5 08/03/2020    MCV 90.6 08/03/2020    RDW 14.2 08/03/2020     08/03/2020     CMP    Lab Results   Component Value Date     08/05/2020    K 3.8 08/05/2020    K 5.0 08/02/2020    CL 93 08/05/2020    CO2 22 08/05/2020    BUN 16 08/05/2020    CREATININE 0.6 08/05/2020    GFRAA >60 08/05/2020    GFRAA >60 06/13/2012    AGRATIO 1.0 08/03/2020    LABGLOM >60 08/05/2020    GLUCOSE 119 08/05/2020    PROT 6.6 08/03/2020    PROT 7.6 06/11/2012    CALCIUM 8.4 08/05/2020    BILITOT 0.9 08/03/2020    ALKPHOS 43 08/03/2020    AST 80 08/03/2020    ALT 29 08/03/2020     BMP    Lab Results   Component Value Date     08/05/2020    K 3.8 08/05/2020    K 5.0 08/02/2020    CL 93 08/05/2020    CO2 22 08/05/2020    BUN 16 08/05/2020    CREATININE 0.6 08/05/2020    CALCIUM 8.4 08/05/2020    GFRAA >60 08/05/2020    GFRAA >60 06/13/2012    LABGLOM >60 08/05/2020    GLUCOSE 119 08/05/2020     POCGlucose  No results for input(s): GLUCOSE in the last 72 hours. Coags    Lab Results   Component Value Date    PROTIME 12.1 09/11/2018    INR 1.06 09/11/2018    APTT 24.7 31/90/1714     HCG (If Applicable) No results found for: PREGTESTUR, PREGSERUM, HCG, HCGQUANT   ABGs No results found for: PHART, PO2ART, EIT7GMA, EWL8WSW, BEART, T8EMTYWV   Type & Screen (If Applicable)  No results found for: LABABO, LABRH                         BMI: Wt Readings from Last 3 Encounters:       NPO Status:                          Anesthesia Evaluation  Patient summary reviewed no history of anesthetic complications:   Airway: Mallampati: II        Dental:          Pulmonary:   (+) current smoker                           Cardiovascular:    (+) hypertension:, hyperlipidemia    (-) valvular problems/murmurs                Neuro/Psych:   (+) CVA:, headaches:, psychiatric history:depression/anxiety  (severe anxiety--unsure will proceed with surgery)            GI/Hepatic/Renal:   (+) PUD,      (-) no renal disease and bowel prep       Endo/Other:    (+) DiabetesType II DM, , .    (-) hypothyroidism, hyperthyroidism               Abdominal:           Vascular:     - DVT and PE. Anesthesia Plan      MAC     ASA 3     (From last time: \"Preop BZP to try to manage Ms. Arteaga's anxiety.  She and daughter are willing to try early anxiolytic, but have expressed that she may not want to proceed with surgery today. \" ---She was able to proceed and actually tolerated procedure fairly well. )  Induction: intravenous. MIPS: Prophylactic antiemetics administered. Anesthetic plan and risks discussed with patient and child/children. Plan discussed with CRNA. Attending anesthesiologist reviewed and agrees with Pre Eval content              This pre-anesthesia assessment may be used as a history and physical.    DOS STAFF ADDENDUM:    Pt seen and examined, chart reviewed (including anesthesia, drug and allergy history). No interval changes to history and physical examination. Anesthetic plan, risks, benefits, alternatives, and personnel involved discussed with patient. Patient verbalized an understanding and agrees to proceed.       Cristian Graff MD  March 16, 2021  11:33 AM

## 2021-03-16 NOTE — H&P
Date of Surgery Update:  Yahaira Little was seen, history and physical examination reviewed, and patient examined by me today. There have been no significant clinical changes since the completion of the previous history and physical. The surgical site was confirmed by the patient and me. The risk, benefits, and alternatives of the proposed procedure have been explained to the patient (or appropriate guardian) and understanding verbalized. All questions answered. Patient wishes to proceed.     Electronically signed by: Balaji Rocha MD,3/16/2021,1:13 PM

## 2022-04-12 NOTE — CARE COORDINATION
INITIAL CASE MANAGEMENT ASSESSMENT    Reviewed chart, met with patient to assess possible discharge needs. Explained Case Management role/services. SW interviewed patient via telephone today. Living Situation: Patient reports that she resides in a single family home with one dog. She stated that she has no trouble getting in or out of the property at this time. house. Son. 1 dog. No trouble. ADLs: Prior to medical admission patient's son Doyle Engle assisted her with  housekeeping, cooking and laundry. Patient reports that she doesn't anticipate any needs at discharge. DME: Prior to medical admission patient used a walker, wheelchair, cane, shower chair. She is currently on 2L of oxygen. We will continue to monitor until discharge or until she is on room air. PT/OT Recs: Ordered. SW will follow recommendations after their assessment is complete. Active Services: Prior to medical admission patient used no active services. She doesn't anticipate any needs at discharge. Transportation: Prior to medical admission patient reports that she was an active . She stated that her son Doyle Engle will likely transport her home at discharge. Medications: Patient receives medications from [facility]. At this time there are no issues with access or affordability. PCP: Can't remember doctor. SW to follow up with family regarding PCP if there are home care needs at discharge. PLAN/COMMENTS:   1) PT/OT assessment and follow up with recommendations. SW provided contact information for patient or family to call with any questions. SW will follow and assist as needed. Respectfully submitted,    VICKIE Madison  [facility]   [phone]    Electronically signed by VICKIE Jurado on 8/3/2020 at 4:53 PM Bilobed Transposition Flap Text: The defect edges were debeveled with a #15 scalpel blade.  Given the location of the defect and the proximity to free margins a bilobed transposition flap was deemed most appropriate.  Using a sterile surgical marker, an appropriate bilobe flap drawn around the defect.    The area thus outlined was incised deep to adipose tissue with a #15 scalpel blade.  The skin margins were undermined to an appropriate distance in all directions utilizing iris scissors.

## 2022-07-09 ENCOUNTER — APPOINTMENT (OUTPATIENT)
Dept: CT IMAGING | Age: 77
End: 2022-07-09
Payer: MEDICARE

## 2022-07-09 ENCOUNTER — APPOINTMENT (OUTPATIENT)
Dept: GENERAL RADIOLOGY | Age: 77
End: 2022-07-09
Payer: MEDICARE

## 2022-07-09 ENCOUNTER — HOSPITAL ENCOUNTER (EMERGENCY)
Age: 77
Discharge: HOME | End: 2022-07-09
Attending: EMERGENCY MEDICINE
Payer: MEDICARE

## 2022-07-09 VITALS
HEIGHT: 66 IN | BODY MASS INDEX: 31.53 KG/M2 | WEIGHT: 196.21 LBS | RESPIRATION RATE: 20 BRPM | DIASTOLIC BLOOD PRESSURE: 59 MMHG | OXYGEN SATURATION: 91 % | SYSTOLIC BLOOD PRESSURE: 142 MMHG | TEMPERATURE: 100.9 F | HEART RATE: 95 BPM

## 2022-07-09 DIAGNOSIS — T50.B95A ADVERSE REACTION TO COVID-19 VACCINE: ICD-10-CM

## 2022-07-09 DIAGNOSIS — K80.20 CALCULUS OF GALLBLADDER WITHOUT CHOLECYSTITIS WITHOUT OBSTRUCTION: ICD-10-CM

## 2022-07-09 DIAGNOSIS — R60.0 LOWER EXTREMITY EDEMA: ICD-10-CM

## 2022-07-09 DIAGNOSIS — J44.1 COPD EXACERBATION (HCC): Primary | ICD-10-CM

## 2022-07-09 DIAGNOSIS — R06.02 SHORTNESS OF BREATH: ICD-10-CM

## 2022-07-09 DIAGNOSIS — J40 BRONCHITIS: ICD-10-CM

## 2022-07-09 DIAGNOSIS — R91.1 LUNG NODULE: ICD-10-CM

## 2022-07-09 LAB
A/G RATIO: 1.4 (ref 1.1–2.2)
ALBUMIN SERPL-MCNC: 3.9 G/DL (ref 3.4–5)
ALP BLD-CCNC: 83 U/L (ref 40–129)
ALT SERPL-CCNC: 11 U/L (ref 10–40)
ANION GAP SERPL CALCULATED.3IONS-SCNC: 10 MMOL/L (ref 3–16)
AST SERPL-CCNC: 14 U/L (ref 15–37)
BASOPHILS ABSOLUTE: 0 K/UL (ref 0–0.2)
BASOPHILS RELATIVE PERCENT: 0.2 %
BILIRUB SERPL-MCNC: 0.7 MG/DL (ref 0–1)
BILIRUBIN URINE: NEGATIVE
BLOOD, URINE: ABNORMAL
BUN BLDV-MCNC: 6 MG/DL (ref 7–20)
CALCIUM SERPL-MCNC: 9.8 MG/DL (ref 8.3–10.6)
CHLORIDE BLD-SCNC: 96 MMOL/L (ref 99–110)
CLARITY: CLEAR
CO2: 27 MMOL/L (ref 21–32)
COLOR: YELLOW
CREAT SERPL-MCNC: 0.9 MG/DL (ref 0.6–1.2)
CRYSTALS, UA: ABNORMAL /HPF
EKG ATRIAL RATE: 100 BPM
EKG DIAGNOSIS: NORMAL
EKG P AXIS: 59 DEGREES
EKG P-R INTERVAL: 114 MS
EKG Q-T INTERVAL: 330 MS
EKG QRS DURATION: 104 MS
EKG QTC CALCULATION (BAZETT): 425 MS
EKG R AXIS: -46 DEGREES
EKG T AXIS: 59 DEGREES
EKG VENTRICULAR RATE: 100 BPM
EOSINOPHILS ABSOLUTE: 0.2 K/UL (ref 0–0.6)
EOSINOPHILS RELATIVE PERCENT: 1.4 %
EPITHELIAL CELLS, UA: ABNORMAL /HPF (ref 0–5)
GFR AFRICAN AMERICAN: >60
GFR NON-AFRICAN AMERICAN: >60
GLUCOSE BLD-MCNC: 308 MG/DL (ref 70–99)
GLUCOSE URINE: 500 MG/DL
HCT VFR BLD CALC: 42.9 % (ref 36–48)
HEMOGLOBIN: 13.6 G/DL (ref 12–16)
IMMATURE GRANULOCYTES #: 0 K/UL (ref 0–0.2)
IMMATURE GRANULOCYTES %: 0.2 %
KETONES, URINE: NEGATIVE MG/DL
LEUKOCYTE ESTERASE, URINE: NEGATIVE
LYMPHOCYTES ABSOLUTE: 0.8 K/UL (ref 1–5.1)
LYMPHOCYTES RELATIVE PERCENT: 6.1 %
MCH RBC QN AUTO: 29.1 PG (ref 26–34)
MCHC RBC AUTO-ENTMCNC: 31.7 G/DL (ref 32–36.4)
MCV RBC AUTO: 91.9 FL (ref 80–100)
MICROSCOPIC EXAMINATION: YES
MONOCYTES ABSOLUTE: 0.4 K/UL (ref 0–1.3)
MONOCYTES RELATIVE PERCENT: 3 %
MUCUS: ABNORMAL /LPF
NEUTROPHILS ABSOLUTE: 12.3 K/UL (ref 1.7–7.7)
NEUTROPHILS RELATIVE PERCENT: 89.1 %
NITRITE, URINE: NEGATIVE
PDW BLD-RTO: 14.8 % (ref 12.4–15.4)
PH UA: 6 (ref 5–8)
PLATELET # BLD: 289 K/UL (ref 135–450)
PMV BLD AUTO: 10.2 FL (ref 5–10.5)
POTASSIUM REFLEX MAGNESIUM: 4.8 MMOL/L (ref 3.5–5.1)
PRO-BNP: 846 PG/ML (ref 0–449)
PROTEIN UA: NEGATIVE MG/DL
RAPID INFLUENZA  B AGN: NEGATIVE
RAPID INFLUENZA A AGN: NEGATIVE
RBC # BLD: 4.67 M/UL (ref 4–5.2)
RBC UA: ABNORMAL /HPF (ref 0–4)
SARS-COV-2, NAAT: NOT DETECTED
SODIUM BLD-SCNC: 133 MMOL/L (ref 136–145)
SPECIFIC GRAVITY UA: 1.02 (ref 1–1.03)
TOTAL PROTEIN: 6.7 G/DL (ref 6.4–8.2)
TROPONIN: 0.02 NG/ML
TROPONIN: 0.02 NG/ML
URINE REFLEX TO CULTURE: ABNORMAL
URINE TYPE: ABNORMAL
UROBILINOGEN, URINE: 0.2 E.U./DL
WBC # BLD: 13.8 K/UL (ref 4–11)
WBC UA: ABNORMAL /HPF (ref 0–5)

## 2022-07-09 PROCEDURE — 93010 ELECTROCARDIOGRAM REPORT: CPT | Performed by: INTERNAL MEDICINE

## 2022-07-09 PROCEDURE — 81001 URINALYSIS AUTO W/SCOPE: CPT

## 2022-07-09 PROCEDURE — 87804 INFLUENZA ASSAY W/OPTIC: CPT

## 2022-07-09 PROCEDURE — 99285 EMERGENCY DEPT VISIT HI MDM: CPT

## 2022-07-09 PROCEDURE — 71045 X-RAY EXAM CHEST 1 VIEW: CPT

## 2022-07-09 PROCEDURE — 80053 COMPREHEN METABOLIC PANEL: CPT

## 2022-07-09 PROCEDURE — 83880 ASSAY OF NATRIURETIC PEPTIDE: CPT

## 2022-07-09 PROCEDURE — 85025 COMPLETE CBC W/AUTO DIFF WBC: CPT

## 2022-07-09 PROCEDURE — 87635 SARS-COV-2 COVID-19 AMP PRB: CPT

## 2022-07-09 PROCEDURE — 96374 THER/PROPH/DIAG INJ IV PUSH: CPT

## 2022-07-09 PROCEDURE — 6360000002 HC RX W HCPCS: Performed by: EMERGENCY MEDICINE

## 2022-07-09 PROCEDURE — 36415 COLL VENOUS BLD VENIPUNCTURE: CPT

## 2022-07-09 PROCEDURE — 84484 ASSAY OF TROPONIN QUANT: CPT

## 2022-07-09 PROCEDURE — 6360000004 HC RX CONTRAST MEDICATION: Performed by: EMERGENCY MEDICINE

## 2022-07-09 PROCEDURE — 6370000000 HC RX 637 (ALT 250 FOR IP): Performed by: EMERGENCY MEDICINE

## 2022-07-09 PROCEDURE — 93005 ELECTROCARDIOGRAM TRACING: CPT | Performed by: EMERGENCY MEDICINE

## 2022-07-09 PROCEDURE — 71275 CT ANGIOGRAPHY CHEST: CPT

## 2022-07-09 RX ORDER — ACETAMINOPHEN 500 MG
1000 TABLET ORAL ONCE
Status: COMPLETED | OUTPATIENT
Start: 2022-07-09 | End: 2022-07-09

## 2022-07-09 RX ORDER — IPRATROPIUM BROMIDE AND ALBUTEROL SULFATE 2.5; .5 MG/3ML; MG/3ML
1 SOLUTION RESPIRATORY (INHALATION) ONCE
Status: COMPLETED | OUTPATIENT
Start: 2022-07-09 | End: 2022-07-09

## 2022-07-09 RX ORDER — DEXAMETHASONE SODIUM PHOSPHATE 4 MG/ML
6 INJECTION, SOLUTION INTRA-ARTICULAR; INTRALESIONAL; INTRAMUSCULAR; INTRAVENOUS; SOFT TISSUE ONCE
Status: COMPLETED | OUTPATIENT
Start: 2022-07-09 | End: 2022-07-09

## 2022-07-09 RX ORDER — DOXYCYCLINE HYCLATE 100 MG
100 TABLET ORAL 2 TIMES DAILY
Qty: 20 TABLET | Refills: 0 | Status: SHIPPED | OUTPATIENT
Start: 2022-07-09 | End: 2022-07-19

## 2022-07-09 RX ORDER — PREDNISONE 20 MG/1
20 TABLET ORAL 2 TIMES DAILY
Qty: 10 TABLET | Refills: 0 | Status: SHIPPED | OUTPATIENT
Start: 2022-07-09 | End: 2022-07-14

## 2022-07-09 RX ADMIN — ACETAMINOPHEN 1000 MG: 500 TABLET ORAL at 02:38

## 2022-07-09 RX ADMIN — IPRATROPIUM BROMIDE AND ALBUTEROL SULFATE 1 AMPULE: .5; 3 SOLUTION RESPIRATORY (INHALATION) at 02:38

## 2022-07-09 RX ADMIN — IOPAMIDOL 100 ML: 755 INJECTION, SOLUTION INTRAVENOUS at 03:14

## 2022-07-09 RX ADMIN — DEXAMETHASONE SODIUM PHOSPHATE 6 MG: 4 INJECTION, SOLUTION INTRAMUSCULAR; INTRAVENOUS at 02:37

## 2022-07-09 ASSESSMENT — ENCOUNTER SYMPTOMS
VOMITING: 0
NAUSEA: 0
ALLERGIC/IMMUNOLOGIC NEGATIVE: 1
ABDOMINAL DISTENTION: 0
SORE THROAT: 0
SHORTNESS OF BREATH: 1
DIARRHEA: 0
ABDOMINAL PAIN: 0
CONSTIPATION: 0
EYES NEGATIVE: 1
COUGH: 1

## 2022-07-09 ASSESSMENT — PAIN - FUNCTIONAL ASSESSMENT: PAIN_FUNCTIONAL_ASSESSMENT: 0-10

## 2022-07-09 ASSESSMENT — PAIN SCALES - GENERAL: PAINLEVEL_OUTOF10: 0

## 2022-07-09 NOTE — ED NOTES
Patient returned to room via stretcher from CT, daughter at bedside.      Yeison Lindsey RN  07/09/22 [id number]

## 2022-07-09 NOTE — ED TRIAGE NOTES
Patient presents to ED via [facility] for c/o SOB, fever, chills and cough. Patient states she received her second covid vaccine, and since she has been feeling poor and SOB. Upon EMS arrival, patient's o2 was in the 80's. Patient denies cardiac hx or COPD.

## 2022-07-09 NOTE — ED PROVIDER NOTES
[address]      Pt Name: Jean Gatica  MRN: [id number]  [place] 1945  Date ofevaluation: 7/9/2022  Provider: Booker Lefort, MD    CHIEF COMPLAINT       Chief Complaint   Patient presents with    Shortness of Breath         HISTORY OF PRESENT ILLNESS   (Location/Symptom, Timing/Onset,Context/Setting, Quality, Duration, Modifying Factors, Severity)  Note limiting factors. Jean Gatica is a 78 y.o. female  who  has a past medical history of Anxiety, Depression, Diabetes mellitus ([address] Headache(784.0), Hyperlipidemia, Hypertension, and Insomnia. who presents to the emergency department    79-year-old female presents for shortness of breath via EMS with associated fever, chills, cough. Patient has a history of hyperlipidemia hypertension diabetes, smoking. Denies history of CHF or COPD. Symptoms started last night few hours after she received her second COVID-vaccine. Denies any other recent illness. Denies any chest pain. No other associated symptoms signs as listed. No other modifying factors. Gradual onset constant worsening. Nothing seems to make her symptoms better or worse. Not necessarily worse with exertion not better with rest not improved by sitting up or lying down. Cough is productive of white sputum. See review of systems below for further details. The history is provided by the patient and the EMS personnel. NursingNotes were reviewed. REVIEW OF SYSTEMS    (2-9 systems for level 4, 10 or more for level 5)     Review of Systems   Constitutional: Positive for chills and fever. HENT: Negative. Negative for congestion and sore throat. Eyes: Negative. Respiratory: Positive for cough and shortness of breath. Cardiovascular: Negative. Negative for chest pain and palpitations. Gastrointestinal: Negative for abdominal distention, abdominal pain, constipation, diarrhea, nausea and vomiting. Genitourinary: Negative. Negative for dysuria. Musculoskeletal: Negative. Skin: Negative. Allergic/Immunologic: Negative. Neurological: Negative. Negative for light-headedness and headaches. Hematological: Negative. Does not bruise/bleed easily. All other systems reviewed and are negative. Except as noted above the remainder of the review of systems was reviewed and negative. PAST MEDICAL HISTORY     Past Medical History:   Diagnosis Date    Anxiety     Depression     has seen dr Carla Madrigal in the past who placed her on the meds    Diabetes mellitus ([facility]     Headache(784.0)     Hyperlipidemia     Hypertension     Insomnia          SURGICALHISTORY       Past Surgical History:   Procedure Laterality Date    HYSTERECTOMY (CERVIX STATUS UNKNOWN)      INTRACAPSULAR CATARACT EXTRACTION Right 3/2/2021    PHACOEMULSIFICATION WITH INTRAOCULAR LENS IMPLANT - RIGHT performed by Tommy Ritchie MD at [place] [address] Left 3/16/2021    PHACOEMULSIFICATION WITH INTRAOCULAR LENS IMPLANT - LEFT EYE performed by Tommy Ritchie MD at [facility]       Previous Medications    ALBUTEROL SULFATE HFA (VENTOLIN HFA) 108 (90 BASE) MCG/ACT INHALER    Inhale 2 puffs into the lungs 4 times daily    ASPIRIN 81 MG EC TABLET    Take 81 mg by mouth daily    CLONAZEPAM (KLONOPIN) 1 MG TABLET    Take 1 tablet by mouth 2 times daily as needed for Anxiety.     GLIPIZIDE (GLUCOTROL XL) 10 MG EXTENDED RELEASE TABLET    Take 10 mg by mouth daily    LOSARTAN (COZAAR) 25 MG TABLET    Take 25 mg by mouth daily    POLYSACCHARIDE IRON COMPLEX (PROFE) 391.3 (180 FE) MG CAPS    Take 1 capsule by mouth 2 times daily    VENLAFAXINE (EFFEXOR XR) 37.5 MG EXTENDED RELEASE CAPSULE    Take 37.5 mg by mouth daily     VITAMIN D, CHOLECALCIFEROL, PO    Take 10,000 Units by mouth once a week            Codeine and Sulfa antibiotics    FAMILY HISTORY       Family History   Adopted: Yes          SOCIAL HISTORY       Social History     Socioeconomic History    Marital status:      Spouse name: None    Number of children: None    Years of education: None    Highest education level: None   Occupational History    None   Tobacco Use    Smoking status: Current Every Day Smoker     Packs/day: 0.75     Types: Cigarettes    Smokeless tobacco: Never Used   Vaping Use    Vaping Use: Never used   Substance and Sexual Activity    Alcohol use: No    Drug use: No    Sexual activity: None   Other Topics Concern    None   Social History Narrative    None     Social Determinants of Health     Financial Resource Strain:     Difficulty of Paying Living Expenses: Not on file   Food Insecurity:     Worried About Running Out of Food in the Last Year: Not on file    Re of Food in the Last Year: Not on file   Transportation Needs:     Lack of Transportation (Medical): Not on file    Lack of Transportation (Non-Medical):  Not on file   Physical Activity:     Days of Exercise per Week: Not on file    Minutes of Exercise per Session: Not on file   Stress:     Feeling of Stress : Not on file   Social Connections:     Frequency of Communication with Friends and Family: Not on file    Frequency of Social Gatherings with Friends and Family: Not on file    Attends Restoration Services: Not on file    Active Member of [address] [place] [address] [employer] or Organizations: Not on file    Attends Club or Organization Meetings: Not on file    Marital Status: Not on file   Intimate Partner Violence:     Fear of Current or Ex-Partner: Not on file    Emotionally Abused: Not on file    Physically Abused: Not on file    Sexually Abused: Not on file   Housing Stability:     Unable to Pay for Housing in the Last Year: Not on file    Number of [place] in the Last Year: Not on file    Unstable Housing in the Last Year: Not on file       SCREENINGS    Katie Coma Scale  Eye Opening: Spontaneous  Best Verbal Response: Oriented  Best Motor Response: Obeys commands  Katie Coma Scale Score: 15        PHYSICAL EXAM    (up to 7 for level 4, 8 or more for level 5)     ED Triage Vitals [07/09/22 0145]   BP Temp Temp Source Heart Rate Resp SpO2 Height Weight   112/74 (!) 101 °F (38.3 °C) Oral (!) 106 30 (!) 88 % -- 196 lb 3.4 oz (89 kg)       Physical Exam  Vitals and nursing note reviewed. Constitutional:       General: She is not in acute distress. Appearance: She is well-developed. She is not ill-appearing, toxic-appearing or diaphoretic. HENT:      Head: Normocephalic and atraumatic. Right Ear: External ear normal.      Left Ear: External ear normal.      Nose: Nose normal.      Mouth/Throat:      Mouth: Mucous membranes are moist.   Eyes:      Extraocular Movements: Extraocular movements intact. Pupils: Pupils are equal, round, and reactive to light. Cardiovascular:      Rate and Rhythm: Regular rhythm. Tachycardia present. Heart sounds: Normal heart sounds. Pulmonary:      Effort: Pulmonary effort is normal. No tachypnea or respiratory distress. Breath sounds: No stridor. Examination of the right-upper field reveals wheezing. Examination of the left-upper field reveals wheezing. Examination of the right-middle field reveals wheezing. Examination of the left-middle field reveals wheezing. Examination of the right-lower field reveals wheezing and rhonchi. Examination of the left-lower field reveals wheezing and rhonchi. Wheezing and rhonchi present. No rales. Chest:      Chest wall: No tenderness. Abdominal:      General: Abdomen is flat. Bowel sounds are normal.      Palpations: Abdomen is soft. Tenderness: There is no abdominal tenderness. Musculoskeletal:      Cervical back: Neck supple. Right lower leg: Edema present. Left lower leg: Edema present. Skin:     General: Skin is warm and dry. Capillary Refill: Capillary refill takes less than 2 seconds. Neurological:      General: No focal deficit present. Mental Status: She is alert and oriented to person, place, and time. Psychiatric:         Mood and Affect: Mood normal.         Behavior: Behavior normal.         RESULTS     EKG: All EKG's are interpreted by the Emergency Department Physician who either signs or Co-signsthis chart in the absence of a cardiologist.    EKG Interpretation    Interpreted by emergency department physician    Rhythm: sinus tachycardia  Rate: 100-110  Axis: left  Ectopy: none  Conduction: right bundle branch block (incomplete) and left anterior fasciclar block  ST Segments: nonspecific changes  T Waves: non specific changes  Q Waves: none    Clinical Impression: Sinus tachycardia with incomplete right bundle branch block and left anterior fascicular block with left axis deviation. RADIOLOGY:   Non-plain filmimages such as CT, Ultrasound and MRI are read by the radiologist.   Interpretation per the Radiologist below, if available at the time ofthis note:    CTA PULMONARY W CONTRAST   Final Result   No evidence of pulmonary embolism or acute pulmonary abnormality. Extensive diffuse centrilobular pulmonary emphysema with scattered fibrotic   changes. There is a 10 mm nodule in the medial posterior right mid lung. Recommend follow-up CT of the chest in 3 months.          XR CHEST PORTABLE    (Results Pending)         ED BEDSIDE ULTRASOUND:   Performed by ED Physician - none    LABS:  Labs Reviewed   CBC WITH AUTO DIFFERENTIAL - Abnormal; Notable for the following components:       Result Value    WBC 13.8 (*)     MCHC 31.7 (*)     Neutrophils Absolute 12.3 (*)     Lymphocytes Absolute 0.8 (*)     All other components within normal limits   COMPREHENSIVE METABOLIC PANEL W/ REFLEX TO MG FOR LOW K - Abnormal; Notable for the following components:    Sodium 133 (*)     Chloride 96 (*)     Glucose 308 (*)     BUN 6 (*)     AST 14 (*)     All other components within normal Diagnosis: COPD, viral syndrome, COVID-19, vaccine side effect, acute Coronary Syndrome, Congestive Heart Failure, Myocardial Infarction, Pulmonary Embolus, Pneumonia, Pneumothorax, other    72-year-old female presents with shortness of breath, fever, tachycardia, mild hypoxia. She does have bilateral wheezing and a history of 30+ years of smoking denies any history of COPD but states she does occasionally have to use an inhaler. No recent steroid use. Patient did recently receive her COVID-vaccine which may be the cause of her symptoms or she may have already been infected with COVID. Patient's EKG shows sinus tachycardia with some signs of right heart strain. She also has no history of CHF but does have bilateral lower extremity edema. Will obtain labs including CBC, CMP, BNP, troponin, urinalysis as well as give breathing treatment, steroids, Tylenol. We will also obtain CT PE. Will reeval closely and disposition accordingly. Patient is currently saturating 88% to 90% on room air. Will reevaluate after she receives her breathing treatment and will give further supplemental oxygen if necessary. Patient does not wear supplemental oxygen at home but also is not sure what her normal oxygen baseline is given her long history of smoking her normal oxygenation is probably lower than 94%. Of note this initial patient encounter took place during downtime so orders were placed secondarily as well as charting took place approximately 1 hour after initial patient interaction. -  Work-up included:  See above  -  ED treatment included: See above  -  Results discussed with patient. The patient is agreeable with plan of care and disposition. Is this patient to be included in the SEP-1 Core Measure due to severe sepsis or septic shock?    No   Exclusion criteria - the patient is NOT to be included for SEP-1 Core Measure due to:  Viral etiology found or highly suspected (including COVID-19) without concomitant consultation time, dictation/documentation time, and interpretation of the labwork. (This time excludes time spent performing procedures). CONSULTS:  None    PROCEDURES:  Unless otherwise noted below, none     Procedures    FINAL IMPRESSION      1. COPD exacerbation ([address]    2. Adverse reaction to COVID-19 vaccine    3. Shortness of breath    4. Lower extremity edema    5. Bronchitis    6. Calculus of gallbladder without cholecystitis without obstruction    7. Lung nodule          DISPOSITION/PLAN   DISPOSITION        PATIENT REFERREDTO:  Arturo Bhatti, APRN - CNP  [address]  [address] [address]  [phone]    Schedule an appointment as soon as possible for a visit       [facility]  [address]  [phone]    As needed, If symptoms worsen    Fernando Cosby MD  [address]  [address] [facility] [address] [phone]    Schedule an appointment as soon as possible for a visit       [facility]  [address]  Schedule an appointment as soon as possible for a visit         DISCHARGEMEDICATIONS:  New Prescriptions    DOXYCYCLINE HYCLATE (VIBRA-TABS) 100 MG TABLET    Take 1 tablet by mouth 2 times daily for 10 days    PREDNISONE (DELTASONE) 20 MG TABLET    Take 1 tablet by mouth 2 times daily for 5 days          (Please note that portions of this note were completed with a voice recognition program.  Efforts were made to edit the dictations but occasionally words are mis-transcribed.)    Fer Ayon MD (electronically signed)  Attending Emergency Physician          Fer Ayon MD  07/09/22 3543

## 2022-07-09 NOTE — ED NOTES
Provider order placed for patient's discharge. Provider reviewed decision to discharge with the patient. Discharge paperwork and any prescriptions were reviewed with the patient. Patient verbalized understanding of discharge education and any prescriptions and has no further questions or further needs at this time. Patient left with all personal belongings and was stable upon departure. Patient thanked for choosing [facility] and informed to return should any need arise.        Debi Adams RN  07/09/22 7641

## (undated) DEVICE — MICROSURGICAL INSTRUMENT "J" SHAPED CANNULA 27GA: Brand: ALCON

## (undated) DEVICE — GAUZE,SPONGE,4"X4",12PLY,STERILE,LF,1/PK: Brand: MEDLINE

## (undated) DEVICE — Device: Brand: MEDEX

## (undated) DEVICE — SYRINGE TB 1ML TRNSLUC BRL WHT PLUNG BLK MRK CONVENTIONAL

## (undated) DEVICE — SOLUTION IRRIG BSS ST 500ML

## (undated) DEVICE — PACK PHACO 0.9MM 45DEG BAL FMS IRR ASPIR CENTURION

## (undated) DEVICE — SYRINGE MED 30ML STD CLR PLAS LUERLOCK TIP N CTRL DISP

## (undated) DEVICE — THE MONARCH® "D" CARTRIDGE IS A SINGLE-USE POLYPROPYLENE CARTRIDGE FOR POSTERIOR CHAMBER IOL DELIVERY: Brand: MONARCH® III

## (undated) DEVICE — GLOVE SURG SZ 7.5 L11.73IN FNGR THK9.8MIL STRW LTX POLYMER

## (undated) DEVICE — Device

## (undated) DEVICE — SOLUTION IRRIGATION BAL SALT SOLUTION 15 ML STRL BSS

## (undated) DEVICE — SOLUTION IV IRRIG WATER 500ML POUR BRL ST 2F7113